# Patient Record
Sex: MALE | Race: WHITE | Employment: OTHER | URBAN - METROPOLITAN AREA
[De-identification: names, ages, dates, MRNs, and addresses within clinical notes are randomized per-mention and may not be internally consistent; named-entity substitution may affect disease eponyms.]

---

## 2017-05-23 ENCOUNTER — OFFICE VISIT (OUTPATIENT)
Dept: CARDIOLOGY CLINIC | Age: 57
End: 2017-05-23

## 2017-05-23 VITALS
WEIGHT: 242 LBS | SYSTOLIC BLOOD PRESSURE: 126 MMHG | DIASTOLIC BLOOD PRESSURE: 72 MMHG | BODY MASS INDEX: 32.07 KG/M2 | HEIGHT: 73 IN | HEART RATE: 81 BPM

## 2017-05-23 DIAGNOSIS — E78.5 HYPERLIPIDEMIA, UNSPECIFIED HYPERLIPIDEMIA TYPE: ICD-10-CM

## 2017-05-23 DIAGNOSIS — I48.0 PAF (PAROXYSMAL ATRIAL FIBRILLATION) (HCC): Primary | ICD-10-CM

## 2017-05-23 DIAGNOSIS — I45.10 INCOMPLETE RBBB: ICD-10-CM

## 2017-05-23 RX ORDER — ASPIRIN 81 MG/1
TABLET ORAL DAILY
COMMUNITY
End: 2017-12-08 | Stop reason: ALTCHOICE

## 2017-05-23 NOTE — LETTER
Gabriella Hough 1960 5/23/2017 Dear TERRANCE Zavaleta I had the pleasure of evaluating  Mr. Michael Concepcion in office today. Below are the relevant portions of my assessment and plan of care. ICD-10-CM ICD-9-CM 1. PAF (paroxysmal atrial fibrillation) (HCC) I48.0 427.31   
 no recent episode 
aspirin 2. Incomplete RBBB I45.10 426.4   
 old 3. Hyperlipidemia, unspecified hyperlipidemia type E78.5 272.4   
 stable-on lipitor 
lab with pcp Current Outpatient Prescriptions Medication Sig Dispense Refill  aspirin delayed-release 81 mg tablet Take  by mouth daily.  atorvastatin (LIPITOR) 20 mg tablet Take  by mouth daily. Orders Placed This Encounter  aspirin delayed-release 81 mg tablet Sig: Take  by mouth daily. If you have questions, please do not hesitate to call me. I look forward to following Mr. Michael Concepcion along with you. Sincerely, Bita Mora MD

## 2017-05-23 NOTE — MR AVS SNAPSHOT
Visit Information Date & Time Provider Department Dept. Phone Encounter #  
 5/23/2017  9:00 AM Jorge L Daniels MD [facility] [phone] [id number] Follow-up Instructions Return in about 6 months (around 11/23/2017). Upcoming Health Maintenance Date Due Hepatitis C Screening 1960 DTaP/Tdap/Td series (1 - Tdap) 6/4/1981 FOBT Q 1 YEAR AGE 50-75 6/4/2010 INFLUENZA AGE 9 TO ADULT 8/1/2017 Allergies as of 5/23/2017  Review Complete On: 5/23/2017 By: Jorge L Daniels MD  
 No Known Allergies Current Immunizations  Never Reviewed No immunizations on file. Not reviewed this visit You Were Diagnosed With   
  
 Codes Comments PAF [facility]oxysmal [facility] fibrillation) ([facility]    -  Primary ICD-10-CM: I48.0 ICD-9-CM: 427.31 no recent episode 
aspirin Incomplete RBBB     ICD-10-CM: I45.10 ICD-9-CM: 0.4 old Hyperlipidemia, unspecified hyperlipidemia type     ICD-10-CM: E78.5 ICD-9-CM: 272.4 stable-on lipitor 
lab with pcp Vitals BP Pulse Height(growth percentile) Weight(growth percentile) BMI Smoking Status 126/72 81 6' 1\" (1.854 m) 242 lb (109.8 kg) 31.93 kg/m2 Former Smoker Vitals History BMI and BSA Data Body Mass Index Body Surface Area  
 31.93 kg/m 2 2.38 m 2 Your Updated Medication List  
  
   
This list is accurate as of: 5/23/17  9:45 AM.  Always use your most recent med list.  
  
  
  
  
 aspirin delayed-release 81 mg tablet Take  by mouth daily. atorvastatin 20 mg tablet Commonly known as:  LIPITOR Take  by mouth daily. Follow-up Instructions Return in about 6 months (around 11/23/2017). Introducing [facility] & [facility]! Brandon Urbina introduces [employer] patient portal. Now you can access parts of your medical record, email your doctor's office, and request medication refills online. 1. In your internet browser, go to [email or web address]. [email or web address] Iron Software 2. Click on the First Time User? Click Here link in the Sign In box. You will see the New Member Sign Up page. 3. Enter your Adisn Access Code exactly as it appears below. You will not need to use this code after youve completed the sign-up process. If you do not sign up before the expiration date, you must request a new code. · Adisn Access Code: [email or web address] Expires: 8/21/2017  9:23 AM 
 
4. Enter the last four digits of your Social Security Number (xxxx) and Date of Birth (mm/dd/yyyy) as indicated and click Submit. You will be taken to the next sign-up page. 5. Create a Adisn ID. This will be your Adisn login ID and cannot be changed, so think of one that is secure and easy to remember. 6. Create a Adisn password. You can change your password at any time. 7. Enter your Password Reset Question and Answer. This can be used at a later time if you forget your password. 8. Enter your e-mail address. You will receive e-mail notification when new information is available in [address] 9. Click Sign Up. You can now view and download portions of your medical record. 10. Click the Download Summary menu link to download a portable copy of your medical information. If you have questions, please visit the Frequently Asked Questions section of the Adisn website. Remember, Adisn is NOT to be used for urgent needs. For medical emergencies, dial 911. Now available from your iPhone and Android! Please provide this summary of care documentation to your next provider. Your primary care clinician is listed as Randall Torres. If you have any questions after today's visit, please call [phone].

## 2017-05-23 NOTE — PROGRESS NOTES
HISTORY OF PRESENT ILLNESS  Sav Cantu is a 64 y.o. male. HPI Comments: Patient with paf,hyperlipidemia  On follow up patient denies any chest pains,sob, palpitation or other significant symptoms. One episode of extreme fatigue while in house-resolved with rest-no recurrence    Palpitations    The history is provided by the patient. This is a new problem. The problem has been resolved. The problem is associated with nothing. Pertinent negatives include no fever, no chest pain, no claudication, no orthopnea, no PND, no abdominal pain, no nausea, no vomiting, no headaches, no dizziness, no weakness, no cough, no hemoptysis, no shortness of breath and no sputum production. Review of Systems   Constitutional: Negative for chills and fever. HENT: Negative for nosebleeds. Eyes: Negative for blurred vision and double vision. Respiratory: Negative for cough, hemoptysis, sputum production, shortness of breath and wheezing. Cardiovascular: Negative for chest pain, palpitations, orthopnea, claudication, leg swelling and PND. Gastrointestinal: Negative for abdominal pain, heartburn, nausea and vomiting. Musculoskeletal: Negative for myalgias. Skin: Negative for rash. Neurological: Negative for dizziness, weakness and headaches. Endo/Heme/Allergies: Does not bruise/bleed easily.      Family History   Problem Relation Age of Onset   [place] Stroke Father        Past Medical History:   Diagnosis Date    Hyperlipidemia        Past Surgical History:   Procedure Laterality Date    HX HERNIA REPAIR         Social History   Substance Use Topics    Smoking status: Former Smoker     Types: Cigarettes     Quit date: 3/9/2013    Smokeless tobacco: Not on file    Alcohol use 0.0 oz/week     0 Standard drinks or equivalent per week      Comment: OCC        No Known Allergies    Outpatient Prescriptions Marked as Taking for the 5/23/17 encounter (Office Visit) with Lesly Laurent MD   Medication Sig Dispense Refill    aspirin delayed-release 81 mg tablet Take  by mouth daily.  atorvastatin (LIPITOR) 20 mg tablet Take  by mouth daily. Visit Vitals    /72    Pulse 81    Ht 6' 1\" (1.854 m)    Wt 109.8 kg (242 lb)    BMI 31.93 kg/m2         Physical Exam   Constitutional: He is oriented to person, place, and time. He appears well-developed and well-nourished. HENT:   Head: Normocephalic and atraumatic. Eyes: Conjunctivae are normal.   Neck: Neck supple. No JVD present. No tracheal deviation present. No thyromegaly present. Cardiovascular: Normal rate, regular rhythm and normal heart sounds. Exam reveals no gallop and no friction rub. No murmur heard. Pulmonary/Chest: Breath sounds normal. No respiratory distress. He has no wheezes. He has no rales. He exhibits no tenderness. Abdominal: Soft. There is no tenderness. Musculoskeletal: He exhibits no edema. Neurological: He is alert and oriented to person, place, and time. Skin: Skin is warm and dry. Psychiatric: He has a normal mood and affect. Mr. Catalina Cruz has a reminder for a \"due or due soon\" health maintenance. I have asked that he contact his primary care provider for follow-up on this health maintenance. I have personally reviewed patient's records available from hospital and other providers and incorporated findings in patient care. pcp-3/2016  SUMMARY:echo:2016  Left ventricle: Systolic function was normal. Ejection fraction was  estimated to be 60 %. No obvious wall motion abnormalities identified in  the views obtained. There was mild concentric hypertrophy. Doppler  parameters were consistent with abnormal left ventricular relaxation  (grade 1 diastolic dysfunction). Mitral valve: There was trivial regurgitation. NUCLEAR IMAGIN2016     Findings:   1. Stress images reveal normal Myoview distrubution in all the LV segments in short axis, vertical and horizontal long axis views.    2. Resting images have a normal uptake. 3. Gated images reveal normal wall motion and the ejection fraction is calculated to be 85%. Conclusion:   1. Normal perfusion scan. 2. Normal wall motion and ejection fraction. 3. Low risk scan.  4/2016-event monitor  Sr,st,no afib,? svt  Assessment         ICD-10-CM ICD-9-CM    1. PAF (paroxysmal atrial fibrillation) (HCC) I48.0 427.31     no recent episode  aspirin   2. Incomplete RBBB I45.10 426.4     old   3. Hyperlipidemia, unspecified hyperlipidemia type E78.5 272.4     stable-on lipitor  lab with pcp   chads-vasc score low  Will use asa qd    There are no discontinued medications. No orders of the defined types were placed in this encounter. Follow-up Disposition:  Return in about 6 months (around 11/23/2017).

## 2017-05-23 NOTE — PROGRESS NOTES
1. Have you been to the ER, urgent care clinic since your last visit? Hospitalized since your last visit?     no  2. Have you seen or consulted any other health care providers outside of the [address] [place] [address] since your last visit? Include any pap smears or colon screening. Yes Where: Dr. Carlyn Baig     3. Since your last visit, have you had any of the following symptoms?   no     4. Have you had any blood work, X-rays or cardiac testing?       Yes Where: Natalya Buitrago

## 2017-11-09 ENCOUNTER — OFFICE VISIT (OUTPATIENT)
Dept: CARDIOLOGY CLINIC | Age: 57
End: 2017-11-09

## 2017-11-09 VITALS
WEIGHT: 260 LBS | SYSTOLIC BLOOD PRESSURE: 119 MMHG | BODY MASS INDEX: 34.46 KG/M2 | HEART RATE: 73 BPM | HEIGHT: 73 IN | DIASTOLIC BLOOD PRESSURE: 71 MMHG

## 2017-11-09 DIAGNOSIS — E66.9 CLASS 1 OBESITY WITHOUT SERIOUS COMORBIDITY WITH BODY MASS INDEX (BMI) OF 34.0 TO 34.9 IN ADULT, UNSPECIFIED OBESITY TYPE: ICD-10-CM

## 2017-11-09 DIAGNOSIS — I45.10 INCOMPLETE RBBB: ICD-10-CM

## 2017-11-09 DIAGNOSIS — E78.5 HYPERLIPIDEMIA, UNSPECIFIED HYPERLIPIDEMIA TYPE: ICD-10-CM

## 2017-11-09 DIAGNOSIS — I48.0 PAF (PAROXYSMAL ATRIAL FIBRILLATION) (HCC): Primary | ICD-10-CM

## 2017-11-09 NOTE — PROGRESS NOTES
HISTORY OF PRESENT ILLNESS  Edyta Maya is a 62 y.o. male. HPI Comments: Patient with paf,hyperlipidemia  On follow up patient denies any chest pains,sob, palpitation or other significant symptoms. One episode of extreme fatigue while in house-resolved with rest-no recurrence    Cholesterol Problem   The history is provided by the patient. This is a chronic problem. The problem occurs constantly. The problem has not changed since onset. Pertinent negatives include no chest pain, no abdominal pain, no headaches and no shortness of breath. Palpitations    The history is provided by the patient. This is a new problem. The problem has been resolved. The problem is associated with nothing. Pertinent negatives include no fever, no chest pain, no claudication, no orthopnea, no PND, no abdominal pain, no nausea, no vomiting, no headaches, no dizziness, no weakness, no cough, no hemoptysis, no shortness of breath and no sputum production. Review of Systems   Constitutional: Negative for chills and fever. HENT: Negative for nosebleeds. Eyes: Negative for blurred vision and double vision. Respiratory: Negative for cough, hemoptysis, sputum production, shortness of breath and wheezing. Cardiovascular: Negative for chest pain, palpitations, orthopnea, claudication, leg swelling and PND. Gastrointestinal: Negative for abdominal pain, heartburn, nausea and vomiting. Musculoskeletal: Negative for myalgias. Skin: Negative for rash. Neurological: Negative for dizziness, weakness and headaches. Endo/Heme/Allergies: Does not bruise/bleed easily.      Family History   Problem Relation Age of Onset    Stroke Father        Past Medical History:   Diagnosis Date    Hyperlipidemia        Past Surgical History:   Procedure Laterality Date    HX HERNIA REPAIR         Social History   Substance Use Topics    Smoking status: Former Smoker     Types: Cigarettes     Quit date: 3/9/2013    Smokeless tobacco: Never Used    Alcohol use 0.0 oz/week     0 Standard drinks or equivalent per week      Comment: OCC        No Known Allergies    Outpatient Prescriptions Marked as Taking for the 11/9/17 encounter (Office Visit) with Julianna Lobato MD   Medication Sig Dispense Refill    apixaban (ELIQUIS) 5 mg tablet Take 1 Tab by mouth two (2) times a day. 60 Tab 6    aspirin delayed-release 81 mg tablet Take  by mouth daily.  atorvastatin (LIPITOR) 20 mg tablet Take  by mouth daily. Visit Vitals    /71    Pulse 73    Ht 6' 1\" (1.854 m)    Wt 117.9 kg (260 lb)    BMI 34.3 kg/m2         Physical Exam   Constitutional: He is oriented to person, place, and time. He appears well-developed and well-nourished. HENT:   Head: Normocephalic and atraumatic. Eyes: Conjunctivae are normal.   Neck: Neck supple. No JVD present. No tracheal deviation present. No thyromegaly present. Cardiovascular: Normal rate and normal heart sounds. An irregularly irregular rhythm present. Exam reveals no gallop and no friction rub. No murmur heard. Pulmonary/Chest: Breath sounds normal. No respiratory distress. He has no wheezes. He has no rales. He exhibits no tenderness. Abdominal: Soft. There is no tenderness. Musculoskeletal: He exhibits no edema. Neurological: He is alert and oriented to person, place, and time. Skin: Skin is warm and dry. Psychiatric: He has a normal mood and affect. Mr. Chester Ocampo has a reminder for a \"due or due soon\" health maintenance. I have asked that he contact his primary care provider for follow-up on this health maintenance. I have personally reviewed patient's records available from hospital and other providers and incorporated findings in patient care. pcp-3/2016  SUMMARY:echo:4/2016  Left ventricle: Systolic function was normal. Ejection fraction was  estimated to be 60 %. No obvious wall motion abnormalities identified in  the views obtained.  There was mild concentric hypertrophy. Doppler  parameters were consistent with abnormal left ventricular relaxation  (grade 1 diastolic dysfunction). Mitral valve: There was trivial regurgitation. NUCLEAR IMAGIN2016     Findings:   1. Stress images reveal normal Myoview distrubution in all the LV segments in short axis, vertical and horizontal long axis views. 2. Resting images have a normal uptake. 3. Gated images reveal normal wall motion and the ejection fraction is calculated to be 85%. Conclusion:   1. Normal perfusion scan. 2. Normal wall motion and ejection fraction. 3. Low risk scan.  2016-event monitor  Sr,st,no afib,? svt  Assessment         ICD-10-CM ICD-9-CM    1. PAF (paroxysmal atrial fibrillation) (HCC) I48.0 427.31     recurrent back in a fib  will add anticoagulation and consider antiarrhythmic in few weeks       2. Incomplete RBBB I45.10 426.4     old   3. Hyperlipidemia, unspecified hyperlipidemia type E78.5 272.4     stable on statin  lab with pcp   4. Class 1 obesity without serious comorbidity with body mass index (BMI) of 34.0 to 34.9 in adult, unspecified obesity type E66.9 278.00     Z68.34 V85.34     ?? sleep apnea  advised follow up at [address] [facility] sleep apnea study   chads-vasc score low  Will use asa every day    2017  Started eliquis for recurrent af-stopped asa  ? Antiarrhythmic in 4 weeks  I have discussed risk benefit and option of use of amiodarone for arrythmia. Risk of toxicity with medication are informed. Patient will require careful monitoring. There are no discontinued medications. Orders Placed This Encounter    apixaban (ELIQUIS) 5 mg tablet     Sig: Take 1 Tab by mouth two (2) times a day. Dispense:  60 Tab     Refill:  6       Follow-up Disposition:  Return in about 4 weeks (around 2017).

## 2017-11-09 NOTE — LETTER
Fritz Taylor 1960 11/9/2017 Dear TERRANCE Culver I had the pleasure of evaluating  Mr. Sage Dale in office today. Below are the relevant portions of my assessment and plan of care. ICD-10-CM ICD-9-CM 1. PAF (paroxysmal atrial fibrillation) (HCC) I48.0 427.31   
 recurrent back in a fib 
will add anticoagulation and consider antiarrhythmic in few weeks 2. Incomplete RBBB I45.10 426.4   
 old 3. Hyperlipidemia, unspecified hyperlipidemia type E78.5 272.4   
 stable on statin 
lab with pcp 4. Class 1 obesity without serious comorbidity with body mass index (BMI) of 34.0 to 34.9 in adult, unspecified obesity type E66.9 278.00   
 Z68.34 V85.34   
 ?? sleep apnea 
advised follow up at [address] [place] [address] sleep apnea study Current Outpatient Prescriptions Medication Sig Dispense Refill  apixaban (ELIQUIS) 5 mg tablet Take 1 Tab by mouth two (2) times a day. 60 Tab 6  
 aspirin delayed-release 81 mg tablet Take  by mouth daily.  atorvastatin (LIPITOR) 20 mg tablet Take  by mouth daily. Orders Placed This Encounter  apixaban (ELIQUIS) 5 mg tablet Sig: Take 1 Tab by mouth two (2) times a day. Dispense:  60 Tab Refill:  6 If you have questions, please do not hesitate to call me. I look forward to following Mr. Sage Dale along with you. Sincerely, Beata Taylor MD

## 2017-11-09 NOTE — PROGRESS NOTES
1. Have you been to the ER, urgent care clinic since your last visit? Hospitalized since your last visit?     no    2. Have you seen or consulted any other health care providers outside of the [address] [place] [address] since your last visit? Include any pap smears or colon screening. Yes Where: pcp     3. Since your last visit, have you had any of the following symptoms?  no

## 2017-11-09 NOTE — MR AVS SNAPSHOT
Visit Information Date & Time Provider Department Dept. Phone Encounter #  
 11/9/2017 10:15 AM Tara Delgado MD [facility] [phone] [id number] Follow-up Instructions Return in about 4 weeks (around 12/7/2017). Upcoming Health Maintenance Date Due Hepatitis C Screening 1960 DTaP/Tdap/Td series (1 - Tdap) 6/4/1981 FOBT Q 1 YEAR AGE 50-75 6/4/2010 Influenza Age 5 to Adult 8/1/2017 Allergies as of 11/9/2017  Review Complete On: 11/9/2017 By: Tara Delgado MD  
 No Known Allergies Current Immunizations  Never Reviewed No immunizations on file. Not reviewed this visit You Were Diagnosed With   
  
 Codes Comments PAF ([facility]oxysmal [facility] fibrillation) [facility]    -  Primary ICD-10-CM: I48.0 ICD-9-CM: 427.31 recurrent back in a fib 
will add anticoagulation and consider antiarrhythmic in few weeks Incomplete RBBB     ICD-10-CM: I45.10 ICD-9-CM: 0.4 old Hyperlipidemia, unspecified hyperlipidemia type     ICD-10-CM: E78.5 ICD-9-CM: 272.4 stable on statin 
lab with pcp Class 1 obesity without serious comorbidity with body mass index (BMI) of 34.0 to 34.9 in adult, unspecified obesity type     ICD-10-CM: E66.9, Z68.34 
ICD-9-CM: 278.00, V85.34 ?? sleep apnea 
advised follow up at [address] [place] [address] sleep apnea study Vitals BP Pulse Height(growth percentile) Weight(growth percentile) BMI Smoking Status 119/71 73 6' 1\" (1.854 m) 260 lb (117.9 kg) 34.3 kg/m2 Former Smoker Vitals History BMI and BSA Data Body Mass Index Body Surface Area  
 34.3 kg/m 2 2.46 m 2 Preferred Pharmacy Pharmacy Name Phone [id number] [address] [phone] Your Updated Medication List  
  
   
This list is accurate as of: 11/9/17 11:15 AM.  Always use your most recent med list.  
  
  
  
  
 apixaban 5 mg tablet Commonly known as:  Feliciana Spatz Take 1 Tab by mouth two (2) times a day. aspirin delayed-release 81 mg tablet Take  by mouth daily. atorvastatin 20 mg tablet Commonly known as:  LIPITOR Take  by mouth daily. Prescriptions Sent to Pharmacy Refills  
 apixaban (ELIQUIS) 5 mg tablet 6 Sig: Take 1 Tab by mouth two (2) times a day. Class: Normal  
 Pharmacy: [facility][id number] - [place], [address] [facility] #: [phone] Route: Oral  
  
Follow-up Instructions Return in about 4 weeks (around 12/7/2017). Introducing [facility] & HEALTH SERVICES! [employer] introduces FoodByNet patient portal. Now you can access parts of your medical record, email your doctor's office, and request medication refills online. 1. In your internet browser, go to [email or web address] 2. Click on the First Time User? Click Here link in the Sign In box. You will see the New Member Sign Up page. 3. Enter your FoodByNet Access Code exactly as it appears below. You will not need to use this code after youve completed the sign-up process. If you do not sign up before the expiration date, you must request a new code. · FoodByNet Access Code: [email or web address] Expires: 2/7/2018 10:34 AM 
 
4. Enter the last four digits of your Social Security Number (xxxx) and Date of Birth (mm/dd/yyyy) as indicated and click Submit. You will be taken to the next sign-up page. 5. Create a Brootat ID. This will be your FoodByNet login ID and cannot be changed, so think of one that is secure and easy to remember. 6. Create a FoodByNet password. You can change your password at any time. 7. Enter your Password Reset Question and Answer. This can be used at a later time if you forget your password. 8. Enter your e-mail address. You will receive e-mail notification when new information is available in [address] 9. Click Sign Up. You can now view and download portions of your medical record. 10. Click the Download Summary menu link to download a portable copy of your medical information. If you have questions, please visit the Frequently Asked Questions section of the [employer] website. Remember, [employer] is NOT to be used for urgent needs. For medical emergencies, dial 911. Now available from your iPhone and Android! Please provide this summary of care documentation to your next provider. Your primary care clinician is listed as [facility]. If you have any questions after today's visit, please call [phone].

## 2017-12-08 ENCOUNTER — OFFICE VISIT (OUTPATIENT)
Dept: CARDIOLOGY CLINIC | Age: 57
End: 2017-12-08

## 2017-12-08 VITALS
DIASTOLIC BLOOD PRESSURE: 77 MMHG | WEIGHT: 267 LBS | BODY MASS INDEX: 35.39 KG/M2 | HEIGHT: 73 IN | HEART RATE: 86 BPM | SYSTOLIC BLOOD PRESSURE: 122 MMHG

## 2017-12-08 DIAGNOSIS — I48.19 PERSISTENT ATRIAL FIBRILLATION (HCC): Primary | ICD-10-CM

## 2017-12-08 DIAGNOSIS — I45.10 INCOMPLETE RBBB: ICD-10-CM

## 2017-12-08 DIAGNOSIS — R06.02 SOB (SHORTNESS OF BREATH): ICD-10-CM

## 2017-12-08 DIAGNOSIS — Z79.899 HIGH RISK MEDICATION USE: ICD-10-CM

## 2017-12-08 RX ORDER — ASCORBIC ACID 500 MG
1000 TABLET ORAL
COMMUNITY

## 2017-12-08 RX ORDER — AMIODARONE HYDROCHLORIDE 200 MG/1
200 TABLET ORAL DAILY
Qty: 30 TAB | Refills: 6 | Status: SHIPPED | OUTPATIENT
Start: 2017-12-08 | End: 2018-04-04

## 2017-12-08 RX ORDER — FISH OIL/DHA/EPA 1200-144MG
CAPSULE ORAL
COMMUNITY

## 2017-12-08 RX ORDER — BISMUTH SUBSALICYLATE 262 MG
1 TABLET,CHEWABLE ORAL DAILY
COMMUNITY

## 2017-12-08 NOTE — MR AVS SNAPSHOT
Visit Information Date & Time Provider Department Dept. Phone Encounter #  
 12/8/2017  1:45 PM Tiffanie Silva MD [facility] [phone] [id number] Follow-up Instructions Return in about 8 weeks (around 2/2/2018). Upcoming Health Maintenance Date Due Hepatitis C Screening 1960 DTaP/Tdap/Td series (1 - Tdap) 6/4/1981 FOBT Q 1 YEAR AGE 50-75 6/4/2010 Influenza Age 5 to Adult 8/1/2017 Allergies as of 12/8/2017  Review Complete On: 12/8/2017 By: Tiffanie Silva MD  
 No Known Allergies Current Immunizations  Never Reviewed No immunizations on file. Not reviewed this visit You Were Diagnosed With   
  
 Codes Comments Persistent atrial fibrillation (HCC)    -  Primary ICD-10-CM: I48.1 ICD-9-CM: 427.31 Incomplete RBBB     ICD-10-CM: I45.10 ICD-9-CM: 426.4 SOB (shortness of breath)     ICD-10-CM: R06.02 
ICD-9-CM: 786.05 awaiting sleep study High risk medication use     ICD-10-CM: Z79.899 ICD-9-CM: V58.69 starting amiodarone 
cardioversion in few weeks Vitals BP Pulse Height(growth percentile) Weight(growth percentile) BMI Smoking Status 122/77 86 6' 1\" (1.854 m) 267 lb (121.1 kg) 35.23 kg/m2 Former Smoker Vitals History BMI and BSA Data Body Mass Index Body Surface Area  
 35.23 kg/m 2 2.5 m 2 Preferred Pharmacy Pharmacy Name Phone [facility][id number] - [address] [phone] Your Updated Medication List  
  
   
This list is accurate as of: 12/8/17  2:01 PM.  Always use your most recent med list.  
  
  
  
  
 amiodarone 200 mg tablet Commonly known as:  CORDARONE Take 1 Tab by mouth daily. apixaban 5 mg tablet Commonly known as:  Aliyah Can Take 1 Tab by mouth two (2) times a day. atorvastatin 20 mg tablet Commonly known as:  LIPITOR Take  by mouth daily. CALCARB 600 PO Take  by mouth. fish oil-dha-epa 1,200-144-216 mg Cap Take  by mouth.  
  
 multivitamin tablet Commonly known as:  ONE A DAY Take 1 Tab by mouth daily. VITAMIN C 500 mg tablet Generic drug:  ascorbic acid (vitamin C) Take 1,000 mg by mouth. Prescriptions Sent to Pharmacy Refills  
 amiodarone (CORDARONE) 200 mg tablet 6 Sig: Take 1 Tab by mouth daily. Class: Normal  
 Pharmacy: [id number] [address] [facility] #: [phone] Route: Oral  
  
Follow-up Instructions Return in about 8 weeks (around 2/2/2018). To-Do List   
 12/08/2017 Cardiac Services:  CARDIOVERSION, ELECTIVE Patient Instructions 1. You are scheduled to have a Cardioversion on 1/12/18  at [facility] .   Please check in at           . 2. Please go to THE [facility] and park in the outpatient parking lot that is located around to the back of the hospital and enter through the [facility] building. See map for directions. Once you enter through the [facility] check in with the  there. If for some reason, there is not a  available, please use the phone that is there. The  will either give you directions or assist you in getting to the cath holding area. 3.   You are not to eat or drink anything after midnight the morning of the procedure. If you are scheduled after 2 PM,   You may have a clear liquid breakfast before 8 AM the morning of  the       procedure and then nothing to eat or drink after 8 AM.  Clear liquids include:  Jello, Broth, Apple Juice, Water and Black Coffee. 4. Please continue to take your medications with a small sip of water. 5. If you are diabetic, do not take your insulin/sugar pill the morning of the procedure.  
 
6. We encourage families to wait in the waiting room on the first floor while the procedure is being done. The Doctor will come out and talk with you as soon as the procedure is over. 7. You will need someone to drive you home, so please have someone available. 8. If you or your family have any questions, please call our office Monday- Friday, 
     9:00 a.m.  4:30 p.m., at [phone], and ask to speak to one of the nurses. Introducing [facility]! [facility] introduces [employer] patient portal. Now you can access parts of your medical record, email your doctor's office, and request medication refills online. 1. In your internet browser, go to [email or web address]"[email or web address]. ONtheAIR/"[employer] 2. Click on the First Time User? Click Here link in the Sign In box. You will see the New Member Sign Up page. 3. Enter your [employer] Access Code exactly as it appears below. You will not need to use this code after youve completed the sign-up process. If you do not sign up before the expiration date, you must request a new code. · [employer] Access Code: [email or web address] Expires: 2/7/2018 10:34 AM 
 
4. Enter the last four digits of your Social Security Number (xxxx) and Date of Birth (mm/dd/yyyy) as indicated and click Submit. You will be taken to the next sign-up page. 5. Create a [employer] ID. This will be your [employer] login ID and cannot be changed, so think of one that is secure and easy to remember. 6. Create a [employer] password. You can change your password at any time. 7. Enter your Password Reset Question and Answer. This can be used at a later time if you forget your password. 8. Enter your e-mail address. You will receive e-mail notification when new information is available in [address] 9. Click Sign Up. You can now view and download portions of your medical record. 10. Click the Download Summary menu link to download a portable copy of your medical information.  
 
If you have questions, please visit the Frequently Asked Questions section of the [employer]. Remember, [employer] is NOT to be used for urgent needs. For medical emergencies, dial 911. Now available from your iPhone and Android! Please provide this summary of care documentation to your next provider. Your primary care clinician is listed as Lashawn Agee. If you have any questions after today's visit, please call [phone].

## 2017-12-08 NOTE — PROGRESS NOTES
HISTORY OF PRESENT ILLNESS  Kalina Arceo is a 62 y.o. male. HPI Comments: Patient with paf,hyperlipidemia  On follow up patient denies any chest pains,sob, palpitation or other significant symptoms. One episode of extreme fatigue while in house-resolved with rest-no recurrence    Cholesterol Problem   The history is provided by the patient. This is a chronic problem. The problem occurs constantly. The problem has not changed since onset. Pertinent negatives include no chest pain, no abdominal pain, no headaches and no shortness of breath. Palpitations    The history is provided by the patient. This is a new problem. The problem has been resolved. The problem is associated with nothing. Pertinent negatives include no fever, no chest pain, no claudication, no orthopnea, no PND, no abdominal pain, no nausea, no vomiting, no headaches, no dizziness, no weakness, no cough, no hemoptysis, no shortness of breath and no sputum production. Review of Systems   Constitutional: Negative for chills and fever. HENT: Negative for nosebleeds. Eyes: Negative for blurred vision and double vision. Respiratory: Negative for cough, hemoptysis, sputum production, shortness of breath and wheezing. Cardiovascular: Positive for palpitations. Negative for chest pain, orthopnea, claudication, leg swelling and PND. Gastrointestinal: Negative for abdominal pain, heartburn, nausea and vomiting. Musculoskeletal: Negative for myalgias. Skin: Negative for rash. Neurological: Negative for dizziness, weakness and headaches. Endo/Heme/Allergies: Does not bruise/bleed easily.      Family History   Problem Relation Age of Onset    Stroke Father        Past Medical History:   Diagnosis Date    Hyperlipidemia        Past Surgical History:   Procedure Laterality Date    HX HERNIA REPAIR         Social History   Substance Use Topics    Smoking status: Former Smoker     Types: Cigarettes     Quit date: 3/9/2013   Barbara Flowers Smokeless tobacco: Never Used    Alcohol use 0.0 oz/week     0 Standard drinks or equivalent per week      Comment: OCC        No Known Allergies    Outpatient Prescriptions Marked as Taking for the 12/8/17 encounter (Office Visit) with Elizabeth Hinojosa MD   Medication Sig Dispense Refill    CALCIUM CARBONATE (CALCARB 600 PO) Take  by mouth.  ascorbic acid, vitamin C, (VITAMIN C) 500 mg tablet Take 1,000 mg by mouth.  fish oil-dha-epa 1,200-144-216 mg cap Take  by mouth.  multivitamin (ONE A DAY) tablet Take 1 Tab by mouth daily.  amiodarone (CORDARONE) 200 mg tablet Take 1 Tab by mouth daily. 30 Tab 6    apixaban (ELIQUIS) 5 mg tablet Take 1 Tab by mouth two (2) times a day. 60 Tab 6    atorvastatin (LIPITOR) 20 mg tablet Take  by mouth daily. Visit Vitals    /77    Pulse 86    Ht 6' 1\" (1.854 m)    Wt 121.1 kg (267 lb)    BMI 35.23 kg/m2         Physical Exam   Constitutional: He is oriented to person, place, and time. He appears well-developed and well-nourished. HENT:   Head: Normocephalic and atraumatic. Eyes: Conjunctivae are normal.   Neck: Neck supple. No JVD present. No tracheal deviation present. No thyromegaly present. Cardiovascular: Normal rate and normal heart sounds. An irregularly irregular rhythm present. Exam reveals no gallop and no friction rub. No murmur heard. Pulmonary/Chest: Breath sounds normal. No respiratory distress. He has no wheezes. He has no rales. He exhibits no tenderness. Abdominal: Soft. There is no tenderness. Musculoskeletal: He exhibits no edema. Neurological: He is alert and oriented to person, place, and time. Skin: Skin is warm and dry. Psychiatric: He has a normal mood and affect. Mr. Megan Colunga has a reminder for a \"due or due soon\" health maintenance. I have asked that he contact his primary care provider for follow-up on this health maintenance.     I have personally reviewed patient's records available from hospital and other providers and incorporated findings in patient care. pcp-3/2016  SUMMARY:echo:2016  Left ventricle: Systolic function was normal. Ejection fraction was  estimated to be 60 %. No obvious wall motion abnormalities identified in  the views obtained. There was mild concentric hypertrophy. Doppler  parameters were consistent with abnormal left ventricular relaxation  (grade 1 diastolic dysfunction). Mitral valve: There was trivial regurgitation. NUCLEAR IMAGIN2016     Findings:   1. Stress images reveal normal Myoview distrubution in all the LV segments in short axis, vertical and horizontal long axis views. 2. Resting images have a normal uptake. 3. Gated images reveal normal wall motion and the ejection fraction is calculated to be 85%. Conclusion:   1. Normal perfusion scan. 2. Normal wall motion and ejection fraction. 3. Low risk scan.  2016-event monitor  Sr,st,no afib,? svt  Assessment         ICD-10-CM ICD-9-CM    1. Persistent atrial fibrillation (HCC) I48.1 427.31 CARDIOVERSION, ELECTIVE   2. Incomplete RBBB I45.10 426.4    3. SOB (shortness of breath) R06.02 786.05     awaiting sleep study   4. High risk medication use Z79.899 V58.69     starting amiodarone  cardioversion in few weeks   chads-vasc score low  Will use asa every day    2017  Started eliquis for recurrent af-stopped asa  ? Antiarrhythmic in 4 weeks  I have discussed risk benefit and option of use of amiodarone for arrythmia. Risk of toxicity with medication are informed. Patient will require careful monitoring.   2017-persistant a fib  Started amiodarone-cardioversion in 4 weeks  Medications Discontinued During This Encounter   Medication Reason    aspirin delayed-release 81 mg tablet Therapy Completed       Orders Placed This Encounter    CARDIOVERSION, ELECTIVE     Standing Status:   Future     Standing Expiration Date:   2018     Order Specific Question:   Reason for Exam:     Answer: af    amiodarone (CORDARONE) 200 mg tablet     Sig: Take 1 Tab by mouth daily. Dispense:  30 Tab     Refill:  6       Follow-up Disposition:  Return in about 8 weeks (around 2/2/2018).

## 2017-12-08 NOTE — LETTER
Edyta Maya 1960 12/8/2017 Dear TERRANCE Contreras I had the pleasure of evaluating  Mr. Adri Mccormick in office today. Below are the relevant portions of my assessment and plan of care. ICD-10-CM ICD-9-CM 1. Persistent atrial fibrillation (HCC) I48.1 427.31 CARDIOVERSION, ELECTIVE 2. Incomplete RBBB I45.10 426.4 3. SOB (shortness of breath) R06.02 786.05   
 awaiting sleep study 4. High risk medication use Z79.899 V58.69   
 starting amiodarone 
cardioversion in few weeks Current Outpatient Prescriptions Medication Sig Dispense Refill  CALCIUM CARBONATE (CALCARB 600 PO) Take  by mouth.  ascorbic acid, vitamin C, (VITAMIN C) 500 mg tablet Take 1,000 mg by mouth.  fish oil-dha-epa 1,200-144-216 mg cap Take  by mouth.  multivitamin (ONE A DAY) tablet Take 1 Tab by mouth daily.  amiodarone (CORDARONE) 200 mg tablet Take 1 Tab by mouth daily. 30 Tab 6  
 apixaban (ELIQUIS) 5 mg tablet Take 1 Tab by mouth two (2) times a day. 60 Tab 6  
 atorvastatin (LIPITOR) 20 mg tablet Take  by mouth daily. Orders Placed This Encounter  CARDIOVERSION, ELECTIVE Standing Status:   Future Standing Expiration Date:   6/8/2018 Order Specific Question:   Reason for Exam: Answer:   af  
 CALCIUM CARBONATE (CALCARB 600 PO) Sig: Take  by mouth.  ascorbic acid, vitamin C, (VITAMIN C) 500 mg tablet Sig: Take 1,000 mg by mouth.  fish oil-dha-epa 1,200-144-216 mg cap Sig: Take  by mouth.  multivitamin (ONE A DAY) tablet Sig: Take 1 Tab by mouth daily.  amiodarone (CORDARONE) 200 mg tablet Sig: Take 1 Tab by mouth daily. Dispense:  30 Tab Refill:  6 If you have questions, please do not hesitate to call me. I look forward to following Mr. Adri Mccormick along with you. Sincerely, Thea Boyer MD

## 2017-12-08 NOTE — PATIENT INSTRUCTIONS
1. You are scheduled to have a Cardioversion on 1/12/18  at [facility] .   Please check in at           . 2. Please go to [facility] and park in the outpatient parking lot that is located around to the back of the hospital and enter through the [facility] [address] building. See map for directions. Once you enter through the [facility] [address] check in with the  there. If for some reason, there is not a  available, please use the phone that is there. The  will either give you directions or assist you in getting to the cath holding area. 3.   You are not to eat or drink anything after midnight the morning of the procedure. If you are scheduled after 2 PM,   You may have a clear liquid breakfast before 8 AM the morning of  the       procedure and then nothing to eat or drink after 8 AM.  Clear liquids include:  Jello, Broth, Apple Juice, Water and Black Coffee. 4. Please continue to take your medications with a small sip of water. 5. If you are diabetic, do not take your insulin/sugar pill the morning of the procedure. 6. We encourage families to wait in the waiting room on the first floor while the procedure is being done. The Doctor will come out and talk with you as soon as the procedure is over. 7. You will need someone to drive you home, so please have someone available. 8. If you or your family have any questions, please call our office Monday- Friday,       9:00 a.m. - 4:30 p.m., at [phone], and ask to speak to one of the nurses.

## 2017-12-08 NOTE — PROGRESS NOTES
1. Have you been to the ER, urgent care clinic since your last visit? Hospitalized since your last visit?     no    2. Have you seen or consulted any other health care providers outside of the [address] [place] [address] since your last visit? Include any pap smears or colon screening. Yes Where: pcp     3. Since your last visit, have you had any of the following symptoms? shortness of breath.

## 2018-01-12 ENCOUNTER — ANESTHESIA EVENT (OUTPATIENT)
Dept: CARDIAC CATH/INVASIVE PROCEDURES | Age: 58
End: 2018-01-12
Payer: COMMERCIAL

## 2018-01-12 ENCOUNTER — ANESTHESIA (OUTPATIENT)
Dept: CARDIAC CATH/INVASIVE PROCEDURES | Age: 58
End: 2018-01-12
Payer: COMMERCIAL

## 2018-01-12 ENCOUNTER — HOSPITAL ENCOUNTER (OUTPATIENT)
Dept: CARDIAC CATH/INVASIVE PROCEDURES | Age: 58
Discharge: HOME | End: 2018-01-12
Attending: INTERNAL MEDICINE | Admitting: INTERNAL MEDICINE
Payer: COMMERCIAL

## 2018-01-12 VITALS
OXYGEN SATURATION: 96 % | SYSTOLIC BLOOD PRESSURE: 113 MMHG | BODY MASS INDEX: 34.72 KG/M2 | WEIGHT: 262 LBS | HEART RATE: 78 BPM | HEIGHT: 73 IN | DIASTOLIC BLOOD PRESSURE: 65 MMHG | RESPIRATION RATE: 18 BRPM

## 2018-01-12 LAB
ANION GAP BLD CALC-SCNC: 18 MMOL/L (ref 10–20)
BUN BLD-MCNC: 21 MG/DL (ref 7–18)
CA-I BLD-MCNC: 1.27 MMOL/L (ref 1.12–1.32)
CHLORIDE BLD-SCNC: 103 MMOL/L (ref 100–108)
CO2 BLD-SCNC: 29 MMOL/L (ref 19–24)
CREAT UR-MCNC: 1.4 MG/DL (ref 0.6–1.3)
GLUCOSE BLD STRIP.AUTO-MCNC: 104 MG/DL (ref 74–106)
HCT VFR BLD CALC: 46 % (ref 36–49)
HGB BLD-MCNC: 15.6 G/DL (ref 12–16)
POTASSIUM BLD-SCNC: 4.6 MMOL/L (ref 3.5–5.5)
SODIUM BLD-SCNC: 144 MMOL/L (ref 136–145)

## 2018-01-12 PROCEDURE — 74011000250 HC RX REV CODE- 250

## 2018-01-12 PROCEDURE — 80047 BASIC METABLC PNL IONIZED CA: CPT

## 2018-01-12 PROCEDURE — 93005 ELECTROCARDIOGRAM TRACING: CPT

## 2018-01-12 PROCEDURE — 77030028584 HC ELECTRD ECG PHYS -B

## 2018-01-12 PROCEDURE — 74011250636 HC RX REV CODE- 250/636

## 2018-01-12 PROCEDURE — 92960 CARDIOVERSION ELECTRIC EXT: CPT

## 2018-01-12 PROCEDURE — 76060000031 HC ANESTHESIA FIRST 0.5 HR

## 2018-01-12 RX ORDER — SODIUM CHLORIDE 9 MG/ML
INJECTION, SOLUTION INTRAVENOUS
Status: DISCONTINUED | OUTPATIENT
Start: 2018-01-12 | End: 2018-01-12 | Stop reason: HOSPADM

## 2018-01-12 RX ORDER — PROPOFOL 10 MG/ML
INJECTION, EMULSION INTRAVENOUS AS NEEDED
Status: DISCONTINUED | OUTPATIENT
Start: 2018-01-12 | End: 2018-01-12 | Stop reason: HOSPADM

## 2018-01-12 RX ORDER — LIDOCAINE HYDROCHLORIDE 20 MG/ML
INJECTION, SOLUTION EPIDURAL; INFILTRATION; INTRACAUDAL; PERINEURAL AS NEEDED
Status: DISCONTINUED | OUTPATIENT
Start: 2018-01-12 | End: 2018-01-12 | Stop reason: HOSPADM

## 2018-01-12 RX ADMIN — SODIUM CHLORIDE: 9 INJECTION, SOLUTION INTRAVENOUS at 10:38

## 2018-01-12 RX ADMIN — LIDOCAINE HYDROCHLORIDE 60 MG: 20 INJECTION, SOLUTION EPIDURAL; INFILTRATION; INTRACAUDAL; PERINEURAL at 10:40

## 2018-01-12 RX ADMIN — PROPOFOL 120 MG: 10 INJECTION, EMULSION INTRAVENOUS at 10:40

## 2018-01-12 NOTE — DISCHARGE INSTRUCTIONS
Electrical Cardioversion: What to Expect at Home  Your Recovery    Electrical cardioversion is a treatment for an abnormal heartbeat, such as atrial fibrillation, supraventricular tachycardia, or ventricular tachycardia (VT). It uses a brief electrical shock to reset your heart's rhythm. After cardioversion, you may have redness, like a sunburn, where the patches were. The medicines you got to make you sleepy may make you feel drowsy for the rest of the day. Your doctor may have you take medicines to help the heart beat normally and to prevent blood clots. This care sheet gives you a general idea about how long it will take for you to recover. But each person recovers at a different pace. Follow the steps below to feel better as quickly as possible. How can you care for yourself at home? Medicines  ? · Be safe with medicines. Take your medicines exactly as prescribed. Call your doctor if you think you are having a problem with your medicine. You may take one or more of the following medicines:  ¨ Rate-control medicines to slow the heart rate. These include beta-blockers, calcium channel blockers, and digoxin. ¨ Rhythm control medicines that help the heart keep a normal rhythm. ¨ Blood thinners, also called anticoagulants, which help prevent blood clots. You will get more details on the specific medicines your doctor prescribes. Be sure you know how to take your medicines safely. ? · Do not take any vitamins, over-the-counter medicines, or herbal products without talking to your doctor first.   Exercise  ? · Start light exercise if your doctor says that it is okay. Even a small amount will help you get stronger, have more energy, and manage your stress. Walking is an easy way to get exercise. Start out by walking a little more than you did in the hospital. Bit by bit, increase the amount you walk. ? · When you exercise, watch for signs that your heart is working too hard.  You are pushing too hard if you cannot talk while you are exercising. If you become short of breath or dizzy or have chest pain, sit down and rest right away. ? · Check your pulse regularly. Place two fingers on the artery at the palm side of your wrist in line with your thumb. If your heartbeat seems uneven or fast, talk to your doctor. Other instructions  ? · Ask your doctor when you can drive again. ? · Do not smoke. If you need help quitting, talk to your doctor about stop-smoking programs and medicines. These can increase your chances of quitting for good. ? · Limit alcohol. Follow-up care is a key part of your treatment and safety. Be sure to make and go to all appointments, and call your doctor if you are having problems. It's also a good idea to know your test results and keep a list of the medicines you take. When should you call for help? Call 911 anytime you think you may need emergency care. For example, call if:  ? · You passed out (lost consciousness). ? · You have chest pain or pressure. This may occur with:  ¨ Sweating. ¨ Shortness of breath. ¨ Nausea or vomiting. ¨ Pain that spreads from the chest to the neck, jaw, or one or both shoulders or arms. ¨ A fast or uneven pulse. After calling 911, the  may tell you to chew 1 adult-strength or 2 to 4 low-dose aspirin. Wait for an ambulance. Do not try to drive yourself. ? · You have symptoms of a stroke. These may include:  ¨ Sudden numbness, tingling, weakness, or loss of movement in your face, arm, or leg, especially on monica side of your body. ¨ Sudden vision changes. ¨ Sudden trouble speaking. ¨ Sudden confusion or trouble understanding simple statements. ¨ Sudden problems with walking or balance. ¨ A sudden, severe headache that is different from past headaches. ?Call your doctor now or seek immediate medical care if:  ? · You feel dizzy or lightheaded, or you feel like you may faint. ? · You have a fast or irregular heartbeat. ? Watch closely for any changes in your health, and be sure to contact your doctor if you have any problems. Where can you learn more? Go to http://josé-angelo.info/. Enter A617 in the search box to learn more about \"Electrical Cardioversion: What to Expect at Home. \"  Current as of: September 21, 2016  Content Version: 11.4  © 0355-7544 [employer]. Care instructions adapted under license by [employer] (which disclaims liability or warranty for this information). If you have questions about a medical condition or this instruction, always ask your healthcare professional. [place] [address] any warranty or liability for your use of this information. DISCHARGE SUMMARY from Nurse    PATIENT INSTRUCTIONS:    After general anesthesia or intravenous sedation, for 24 hours or while taking prescription Narcotics:  · Limit your activities  · Do not drive and operate hazardous machinery  · Do not make important personal or business decisions  · Do  not drink alcoholic beverages  · If you have not urinated within 8 hours after discharge, please contact your surgeon on call. Report the following to your surgeon:  · Excessive pain, swelling, redness or odor of or around the surgical area  · Temperature over 100.5  · Nausea and vomiting lasting longer than 4 hours or if unable to take medications  · Any signs of decreased circulation or nerve impairment to extremity: change in color, persistent  numbness, tingling, coldness or increase pain  · Any questions    What to do at Home:  Recommended activity: Activity as tolerated and no driving for today,     *  Please give a list of your current medications to your Primary Care Provider. *  Please update this list whenever your medications are discontinued, doses are      changed, or new medications (including over-the-counter products) are added.     *  Please carry medication information at all times in case of emergency situations. These are general instructions for a healthy lifestyle:    No smoking/ No tobacco products/ Avoid exposure to second hand smoke  Surgeon General's Warning:  Quitting smoking now greatly reduces serious risk to your health. Obesity, smoking, and sedentary lifestyle greatly increases your risk for illness    A healthy diet, regular physical exercise & weight monitoring are important for maintaining a healthy lifestyle    You may be retaining fluid if you have a history of heart failure or if you experience any of the following symptoms:  Weight gain of 3 pounds or more overnight or 5 pounds in a week, increased swelling in our hands or feet or shortness of breath while lying flat in bed. Please call your doctor as soon as you notice any of these symptoms; do not wait until your next office visit. Recognize signs and symptoms of STROKE:    F-face looks uneven    A-arms unable to move or move unevenly    S-speech slurred or non-existent    T-time-call 911 as soon as signs and symptoms begin-DO NOT go       Back to bed or wait to see if you get better-TIME IS BRAIN. Warning Signs of HEART ATTACK     Call 911 if you have these symptoms:   Chest discomfort. Most heart attacks involve discomfort in the center of the chest that lasts more than a few minutes, or that goes away and comes back. It can feel like uncomfortable pressure, squeezing, fullness, or pain.  Discomfort in other areas of the upper body. Symptoms can include pain or discomfort in one or both arms, the back, neck, jaw, or stomach.  Shortness of breath with or without chest discomfort.  Other signs may include breaking out in a cold sweat, nausea, or lightheadedness. Don't wait more than five minutes to call 911 - MINUTES MATTER! Fast action can save your life. Calling 911 is almost always the fastest way to get lifesaving treatment.  Emergency Medical Services staff can begin treatment when they arrive -- up to an hour sooner than if someone gets to the hospital by car. The discharge information has been reviewed with the patient. The patient verbalized understanding. Discharge medications reviewed with the patient and appropriate educational materials and side effects teaching were provided.   ___________________________________________________________________________________________________________________________________

## 2018-01-12 NOTE — IP AVS SNAPSHOT
John Ayala 
 
 
 920 [facility] [address] Patient: Iain Santiago MRN: [id number] IJI:[id number]/[id number] About your hospitalization You were admitted on:  January 12, 2018 You last received care in the:  [facility] 1 CATH HOLDING You were discharged on:  January 12, 2018 Why you were hospitalized Your primary diagnosis was:  Not on File Follow-up Information Follow up With Details Comments Contact Info Alpa Quinn MD Schedule an appointment as soon as possible for a visit in 2 weeks  [address] [facility] [address] [facility] [address] 
[phone] EKG after cardioversion Your Scheduled Appointments Friday January 12, 2018 11:45 AM EST CARDIOVERSION with [facility] CATH HOLDING, [facility] ANESTHESIA 2 [facility] CARDIAC CATH LAB [address] [place] [address] 920 [facility] 312 [facility] [address] [facility], [address] Thursday February 01, 2018 11:00 AM EST Follow Up with Alpa Quinn MD  
[facility] ([address] [facility]) [address] [facility]  
[phone] Discharge Orders None A check paul indicates which time of day the medication should be taken. My Medications ASK your doctor about these medications Instructions Each Dose to Equal  
 Morning Noon Evening Bedtime  
 amiodarone 200 mg tablet Commonly known as:  CORDARONE Your last dose was: Your next dose is: Take 1 Tab by mouth daily. 200 mg  
    
   
   
   
  
 apixaban 5 mg tablet Commonly known as:  Niall Rued Your last dose was: Your next dose is: Take 1 Tab by mouth two (2) times a day. 5 mg  
    
   
   
   
  
 atorvastatin 20 mg tablet Commonly known as:  LIPITOR Your last dose was: Your next dose is: Take  by mouth daily. CALCARB 600 PO Your last dose was: Your next dose is: Take  by mouth. fish oil-dha-epa 1,200-144-216 mg Cap Your last dose was: Your next dose is: Take  by mouth.  
     
   
   
   
  
 multivitamin tablet Commonly known as:  ONE A DAY Your last dose was: Your next dose is: Take 1 Tab by mouth daily. 1 Tab VITAMIN C 500 mg tablet Generic drug:  ascorbic acid (vitamin C) Your last dose was: Your next dose is: Take 1,000 mg by mouth. 1000 mg Discharge Instructions Electrical Cardioversion: What to Expect at [facility] Your Recovery Electrical cardioversion is a treatment for an abnormal heartbeat, such as atrial fibrillation, supraventricular tachycardia, or ventricular tachycardia (VT). It uses a brief electrical shock to reset your heart's rhythm. After cardioversion, you may have redness, like a sunburn, where the patches were. The medicines you got to make you sleepy may make you feel drowsy for the rest of the day. Your doctor may have you take medicines to help the heart beat normally and to prevent blood clots. This care sheet gives you a general idea about how long it will take for you to recover. But each person recovers at a different pace. Follow the steps below to feel better as quickly as possible. How can you care for yourself at home? Medicines ? · Be safe with medicines. Take your medicines exactly as prescribed. Call your doctor if you think you are having a problem with your medicine. You may take one or more of the following medicines: 
¨ Rate-control medicines to slow the heart rate. These include beta-blockers, calcium channel blockers, and digoxin. ¨ Rhythm control medicines that help the heart keep a normal rhythm. ¨ Blood thinners, also called anticoagulants, which help prevent blood clots. You will get more details on the specific medicines your doctor prescribes. Be sure you know how to take your medicines safely. ? · Do not take any vitamins, over-the-counter medicines, or herbal products without talking to your doctor first.  
Exercise ? · Start light exercise if your doctor says that it is okay. Even a small amount will help you get stronger, have more energy, and manage your stress. Walking is an easy way to get exercise. Start out by walking a little more than you did in the hospital. Bit by bit, increase the amount you walk. ? · When you exercise, watch for signs that your heart is working too hard. You are pushing too hard if you cannot talk while you are exercising. If you become short of breath or dizzy or have chest pain, sit down and rest right away. ? · Check your pulse regularly. Place two fingers on the artery at the palm side of your wrist in line with your thumb. If your heartbeat seems uneven or fast, talk to your doctor. Other instructions ? · Ask your doctor when you can drive again. ? · Do not smoke. If you need help quitting, talk to your doctor about stop-smoking programs and medicines. These can increase your chances of quitting for good. ? · Limit alcohol. Follow-up care is a key part of your treatment and safety. Be sure to make and go to all appointments, and call your doctor if you are having problems. It's also a good idea to know your test results and keep a list of the medicines you take. When should you call for help? Call 911 anytime you think you may need emergency care. For example, call if: 
? · You passed out (lost consciousness). ? · You have chest pain or pressure. This may occur with: ¨ Sweating. ¨ Shortness of breath. ¨ Nausea or vomiting. ¨ Pain that spreads from the chest to the neck, jaw, or one or both shoulders or arms. ¨ A fast or uneven pulse. After calling 911, the  may tell you to chew 1 adult-strength or 2 to 4 low-dose aspirin. Wait for an ambulance. Do not try to drive yourself. ? · You have symptoms of a stroke. These may include: 
¨ Sudden numbness, tingling, weakness, or loss of movement in your face, arm, or leg, especially on monica side of your body. ¨ Sudden vision changes. ¨ Sudden trouble speaking. ¨ Sudden confusion or trouble understanding simple statements. ¨ Sudden problems with walking or balance. ¨ A sudden, severe headache that is different from past headaches. ?Call your doctor now or seek immediate medical care if: 
? · You feel dizzy or lightheaded, or you feel like you may faint. ? · You have a fast or irregular heartbeat. ? Watch closely for any changes in your health, and be sure to contact your doctor if you have any problems. Where can you learn more? Go to http://josé-angelo.info/. Enter A617 in the search box to learn more about \"Electrical Cardioversion: What to Expect at Home. \" Current as of: September 21, 2016 Content Version: 11.4 © 3715-3158 [employer]. Care instructions adapted under license by [employer] (which disclaims liability or warranty for this information). If you have questions about a medical condition or this instruction, always ask your healthcare professional. [address] any warranty or liability for your use of this information. DISCHARGE SUMMARY from Nurse PATIENT INSTRUCTIONS: 
 
 
F-face looks uneven A-arms unable to move or move unevenly S-speech slurred or non-existent T-time-call 911 as soon as signs and symptoms begin-DO NOT go Back to bed or wait to see if you get better-TIME IS BRAIN. Warning Signs of HEART ATTACK Call 911 if you have these symptoms: ? Chest discomfort. Most heart attacks involve discomfort in the center of the chest that lasts more than a few minutes, or that goes away and comes back. It can feel like uncomfortable pressure, squeezing, fullness, or pain. ? Discomfort in other areas of the upper body. Symptoms can include pain or discomfort in one or both arms, the back, neck, jaw, or stomach. ? Shortness of breath with or without chest discomfort. ? Other signs may include breaking out in a cold sweat, nausea, or lightheadedness. Don't wait more than five minutes to call [address]! Fast action can save your life. Calling 911 is almost always the fastest way to get lifesaving treatment. Emergency Medical Services staff can begin treatment when they arrive  up to an hour sooner than if someone gets to the hospital by car. The discharge information has been reviewed with the patient. The patient verbalized understanding. Discharge medications reviewed with the patient and appropriate educational materials and side effects teaching were provided. ___________________________________________________________________________________________________________________________________ Introducing \[facility] [place] [facility]\"" & HEALTH SERVICES! Ellie Watson introduces Everwise patient portal. Now you can access parts of your medical record, email your doctor's office, and request medication refills online. 1. In your internet browser, go to [email or web address] 2. Click on the First Time User? Click Here link in the Sign In box. You will see the New Member Sign Up page. 3. Enter your Everwise Access Code exactly as it appears below. You will not need to use this code after youve completed the sign-up process. If you do not sign up before the expiration date, you must request a new code. · Everwise Access Code: [email or web address] Expires: 2/7/2018 10:34 AM 
 
4.  Enter the last four digits of your Social Security Number (xxxx) and Date of Birth (mm/dd/yyyy) as indicated and click Submit. You will be taken to the next sign-up page. 5. Create a [employer] ID. This will be your [employer] login ID and cannot be changed, so think of one that is secure and easy to remember. 6. Create a [employer] password. You can change your password at any time. 7. Enter your Password Reset Question and Answer. This can be used at a later time if you forget your password. 8. Enter your e-mail address. You will receive e-mail notification when new information is available in [address] 9. Click Sign Up. You can now view and download portions of your medical record. 10. Click the Download Summary menu link to download a portable copy of your medical information. If you have questions, please visit the Frequently Asked Questions section of the [employer] website. Remember, [employer] is NOT to be used for urgent needs. For medical emergencies, dial 911. Now available from your iPhone and Android! Unresulted Labs-Please follow up with your PCP about these lab tests Order Current Status EKG, 12 LEAD, INITIAL Preliminary result Providers Seen During Your Hospitalization Provider Specialty Primary office phone Cyndy Shaffer MD Cardiology [phone] Your Primary Care Physician (PCP) Primary Care Physician Office Phone Office Fax Nichomark Barrycristian [phone] You are allergic to the following No active allergies Recent Documentation Height Weight BMI Smoking Status 1.854 m 118.8 kg 34.57 kg/m2 Former Smoker Emergency Contacts Name Discharge Info Relation Home Work [employer] [facility] CAREGIVER [3] Brother [24] [phone] [facility] HOSPITAL DISCHARGE CAREGIVER [3] Sister [23]   [phone] Patient Belongings The following personal items are in your possession at time of discharge: 
     Visual Aid: None Please provide this summary of care documentation to your next provider. Signatures-by signing, you are acknowledging that this After Visit Summary has been reviewed with you and you have received a copy. Patient Signature:  ____________________________________________________________ Date:  ____________________________________________________________  
  
Devota Dandy Provider Signature:  ____________________________________________________________ Date:  ____________________________________________________________

## 2018-01-12 NOTE — ANESTHESIA POSTPROCEDURE EVALUATION
Post-Anesthesia Evaluation and Assessment    Patient: Aishwarya Ty MRN: [id number]  SSN: [id number]    YOB: 1960  Age: 62 y.o. Sex: male       Cardiovascular Function/Vital Signs  Visit Vitals    /65    Pulse 78    Resp 18    Ht 6' 1\" (1.854 m)    Wt 118.8 kg (262 lb)    SpO2 96%    BMI 34.57 kg/m2       Patient is status post MAC anesthesia for * No procedures listed *. Nausea/Vomiting: None    Postoperative hydration reviewed and adequate. Pain:  Pain Scale 1: Numeric (0 - 10) (01/12/18 1025)  Pain Intensity 1: 0 (01/12/18 1025)   Managed    Neurological Status: At baseline    Mental Status and Level of Consciousness: Arousable    Pulmonary Status:   O2 Device: Room air (01/12/18 1116)   Adequate oxygenation and airway patent    Complications related to anesthesia: None    Post-anesthesia assessment completed.  No concerns    Signed By: Ayla Akins MD     January 12, 2018

## 2018-01-12 NOTE — H&P
H&P update    No new symptoms  Risks, benefits and alternatives of DCCV explained to patient.   All questions answered

## 2018-01-12 NOTE — ANESTHESIA PREPROCEDURE EVALUATION
Anesthetic History   No history of anesthetic complications            Review of Systems / Medical History  Patient summary reviewed and pertinent labs reviewed    Pulmonary  Within defined limits                 Neuro/Psych   Within defined limits           Cardiovascular            Dysrhythmias : atrial fibrillation      Exercise tolerance: >4 METS     GI/Hepatic/Renal  Within defined limits              Endo/Other        Obesity     Other Findings   Comments:   Risk Factors for Postoperative nausea/vomiting:       History of postoperative nausea/vomiting? NO       Female? NO       Motion sickness? NO       Intended opioid administration for postoperative analgesia? NO      Smoking Abstinence  Current Smoker? NO  Elective Surgery? YES  Seen preoperatively by anesthesiologist or proxy prior to day of surgery? YES  Pt abstained from smoking 24 hours prior to anesthesia?  N/A           Physical Exam    Airway  Mallampati: II  TM Distance: 4 - 6 cm    Mouth opening: Normal     Cardiovascular    Rhythm: irregular           Dental  No notable dental hx       Pulmonary  Breath sounds clear to auscultation               Abdominal  GI exam deferred       Other Findings            Anesthetic Plan    ASA: 3  Anesthesia type: MAC          Induction: Intravenous  Anesthetic plan and risks discussed with: Patient

## 2018-01-12 NOTE — PROCEDURES
Sedation provided by anesthesiology  200J x 1 DCCV provided.   Patient converted to NSR  No complications    Plan:  Continue current meds  Follow up with Dr Ting Rodriguez in 2 weeks

## 2018-01-12 NOTE — PROGRESS NOTES
Pt and sister provided discharge instructions. All questions answered and pt verbalized understanding. PIV removed. Neuro intact. Pt escorted to front of building for discharge.  Patient armband removed and shredded

## 2018-01-13 LAB
ATRIAL RATE: 82 BPM
CALCULATED P AXIS, ECG09: 66 DEGREES
CALCULATED R AXIS, ECG10: 28 DEGREES
CALCULATED T AXIS, ECG11: 31 DEGREES
DIAGNOSIS, 93000: NORMAL
P-R INTERVAL, ECG05: 186 MS
Q-T INTERVAL, ECG07: 402 MS
QRS DURATION, ECG06: 120 MS
QTC CALCULATION (BEZET), ECG08: 469 MS
VENTRICULAR RATE, ECG03: 82 BPM

## 2018-02-01 ENCOUNTER — OFFICE VISIT (OUTPATIENT)
Dept: CARDIOLOGY CLINIC | Age: 58
End: 2018-02-01

## 2018-02-01 VITALS
WEIGHT: 273 LBS | SYSTOLIC BLOOD PRESSURE: 126 MMHG | HEART RATE: 70 BPM | BODY MASS INDEX: 36.18 KG/M2 | DIASTOLIC BLOOD PRESSURE: 83 MMHG | HEIGHT: 73 IN

## 2018-02-01 DIAGNOSIS — I48.19 PERSISTENT ATRIAL FIBRILLATION (HCC): Primary | ICD-10-CM

## 2018-02-01 DIAGNOSIS — Z98.890 HISTORY OF CARDIOVERSION: ICD-10-CM

## 2018-02-01 DIAGNOSIS — I45.10 INCOMPLETE RBBB: ICD-10-CM

## 2018-02-01 DIAGNOSIS — Z79.899 HIGH RISK MEDICATION USE: ICD-10-CM

## 2018-02-01 DIAGNOSIS — R06.02 SOB (SHORTNESS OF BREATH): ICD-10-CM

## 2018-02-01 DIAGNOSIS — E78.5 HYPERLIPIDEMIA, UNSPECIFIED HYPERLIPIDEMIA TYPE: ICD-10-CM

## 2018-02-01 NOTE — PROGRESS NOTES
HISTORY OF PRESENT ILLNESS  Sisi Coreas is a 62 y.o. male. HPI Comments: Patient with paf,hyperlipidemia  On follow up patient denies any chest pains,sob, palpitation or other significant symptoms. One episode of extreme fatigue while in house-resolved with rest-no recurrence    Hypertension   Pertinent negatives include no chest pain, no abdominal pain, no headaches and no shortness of breath. Cholesterol Problem   The history is provided by the patient. This is a chronic problem. The problem occurs constantly. The problem has not changed since onset. Pertinent negatives include no chest pain, no abdominal pain, no headaches and no shortness of breath. Palpitations    The history is provided by the patient. This is a new problem. The problem has been resolved. The problem is associated with nothing. Pertinent negatives include no fever, no chest pain, no claudication, no orthopnea, no PND, no abdominal pain, no nausea, no vomiting, no headaches, no dizziness, no weakness, no cough, no hemoptysis, no shortness of breath and no sputum production. His past medical history is significant for hypertension. Review of Systems   Constitutional: Negative for chills and fever. HENT: Negative for nosebleeds. Eyes: Negative for blurred vision and double vision. Respiratory: Negative for cough, hemoptysis, sputum production, shortness of breath and wheezing. Cardiovascular: Positive for palpitations. Negative for chest pain, orthopnea, claudication, leg swelling and PND. Gastrointestinal: Negative for abdominal pain, heartburn, nausea and vomiting. Musculoskeletal: Negative for myalgias. Skin: Negative for rash. Neurological: Negative for dizziness, weakness and headaches. Endo/Heme/Allergies: Does not bruise/bleed easily.      Family History   Problem Relation Age of Onset    Stroke Father        Past Medical History:   Diagnosis Date    Hyperlipidemia        Past Surgical History: Procedure Laterality Date    HX HERNIA REPAIR         Social History   Substance Use Topics    Smoking status: Former Smoker     Types: Cigarettes     Quit date: 3/9/2013    Smokeless tobacco: Never Used    Alcohol use 0.0 oz/week     0 Standard drinks or equivalent per week      Comment: OCC        No Known Allergies    Outpatient Prescriptions Marked as Taking for the 2/1/18 encounter (Office Visit) with Michelle Singletary MD   Medication Sig Dispense Refill    CALCIUM CARBONATE (CALCARB 600 PO) Take  by mouth.  ascorbic acid, vitamin C, (VITAMIN C) 500 mg tablet Take 1,000 mg by mouth.  fish oil-dha-epa 1,200-144-216 mg cap Take  by mouth.  multivitamin (ONE A DAY) tablet Take 1 Tab by mouth daily.  amiodarone (CORDARONE) 200 mg tablet Take 1 Tab by mouth daily. 30 Tab 6    apixaban (ELIQUIS) 5 mg tablet Take 1 Tab by mouth two (2) times a day. 60 Tab 6    atorvastatin (LIPITOR) 20 mg tablet Take  by mouth daily. Visit Vitals    /83    Pulse 70    Ht 6' 1\" (1.854 m)    Wt 123.8 kg (273 lb)    BMI 36.02 kg/m2         Physical Exam   Constitutional: He is oriented to person, place, and time. He appears well-developed and well-nourished. HENT:   Head: Normocephalic and atraumatic. Eyes: Conjunctivae are normal.   Neck: Neck supple. No JVD present. No tracheal deviation present. No thyromegaly present. Cardiovascular: Normal rate and normal heart sounds. An irregularly irregular rhythm present. Exam reveals no gallop and no friction rub. No murmur heard. Pulmonary/Chest: Breath sounds normal. No respiratory distress. He has no wheezes. He has no rales. He exhibits no tenderness. Abdominal: Soft. There is no tenderness. Musculoskeletal: He exhibits no edema. Neurological: He is alert and oriented to person, place, and time. Skin: Skin is warm and dry. Psychiatric: He has a normal mood and affect.        Mr. Toi Guillermo has a reminder for a \"due or due soon\" health maintenance. I have asked that he contact his primary care provider for follow-up on this health maintenance. I have personally reviewed patient's records available from hospital and other providers and incorporated findings in patient care. pcp-3/2016  SUMMARY:echo:2016  Left ventricle: Systolic function was normal. Ejection fraction was  estimated to be 60 %. No obvious wall motion abnormalities identified in  the views obtained. There was mild concentric hypertrophy. Doppler  parameters were consistent with abnormal left ventricular relaxation  (grade 1 diastolic dysfunction). Mitral valve: There was trivial regurgitation. NUCLEAR IMAGIN2016     Findings:   1. Stress images reveal normal Myoview distrubution in all the LV segments in short axis, vertical and horizontal long axis views. 2. Resting images have a normal uptake. 3. Gated images reveal normal wall motion and the ejection fraction is calculated to be 85%. Conclusion:   1. Normal perfusion scan. 2. Normal wall motion and ejection fraction. 3. Low risk scan.  2016-event monitor  Sr,st,no afib,? Svt  2018  1. Persistent atrial fibrillation (HCC) [I48.1]   Post-procedure Diagnoses:   1. Atrial fibrillation, currently in sinus rhythm [Z86.79]   Procedures:   1. CARDIOVERSION, ELECTIVE [[id number]]      []Hide copied text  []Hover for attribution information  Sedation provided by anesthesiology  200J x 1 DCCV provided. Patient converted to NSR  No complications         5729  Atrial fibrillation   -Incomplete right bundle branch block. ABNORMAL   Assessment         ICD-10-CM ICD-9-CM    1. Persistent atrial fibrillation (HCC) I48.1 427.31     recurrent post cardioversion  refer to ep   2. Hyperlipidemia, unspecified hyperlipidemia type E78.5 272.4     on statin   3. Incomplete RBBB I45.10 426.4     old   4. SOB (shortness of breath) R06.02 786.05 AMB POC EKG ROUTINE W/ 12 LEADS, INTER & REP    stable  exertional   5. History of cardioversion Z98.890 V15.1 REFERRAL TO CARDIAC ELECTROPHYSIOLOGY    1/2018  recurrent a fib-2/2018   6. High risk medication use Z79.899 V58.69     on amiodarone   chads-vasc score low  Will use asa every day    11/2017  Started eliquis for recurrent af-stopped asa  ? Antiarrhythmic in 4 weeks  I have discussed risk benefit and option of use of amiodarone for arrythmia. Risk of toxicity with medication are informed. Patient will require careful monitoring. 12/2017-persistant a fib  Started amiodarone-cardioversion in 4 weeks  1/2018  Cardioverted to sr  2/2018  Recurrent a fib  Awaiting sleep study result  Discussed ablation-  There are no discontinued medications. Orders Placed This Encounter    REFERRAL TO CARDIAC ELECTROPHYSIOLOGY     Referral Priority:   Routine     Referral Type:   Consultation     Referral Reason:   Specialty Services Required     Referred to Provider:   Lauryn Chang MD    AMB POC EKG ROUTINE W/ 12 LEADS, INTER & REP     Order Specific Question:   Reason for Exam:     Answer:   hypertension       Follow-up Disposition:  Return in about 4 months (around 6/1/2018).

## 2018-02-01 NOTE — PROGRESS NOTES
1. Have you been to the ER, urgent care clinic since your last visit? Hospitalized since your last visit? No    2. Have you seen or consulted any other health care providers outside of the [address] [place] [address] since your last visit? Include any pap smears or colon screening. No     3. Since your last visit, have you had any of the following symptoms? .           4. Have you had any blood work, X-rays or cardiac testing? No             5.  Where do you normally have your labs drawn? [facility]    6. Do you need any refills today?    No

## 2018-02-01 NOTE — LETTER
Romana Horning 1960 2/1/2018 Dear TERRANCE San I had the pleasure of evaluating  Mr. Safia Nguyen in office today. Below are the relevant portions of my assessment and plan of care. ICD-10-CM ICD-9-CM 1. Persistent atrial fibrillation (HCC) I48.1 427.31   
 recurrent post cardioversion 
refer to ep 2. Hyperlipidemia, unspecified hyperlipidemia type E78.5 272.4   
 on statin 3. Incomplete RBBB I45.10 426.4   
 old 4. SOB (shortness of breath) R06.02 786.05 AMB POC EKG ROUTINE W/ 12 LEADS, INTER & REP  
 stable 
exertional  
5. History of cardioversion Z98.890 V15.1 REFERRAL TO CARDIAC ELECTROPHYSIOLOGY  
 1/2018 
recurrent a fib-2/2018 6. High risk medication use Z79.899 V58.69   
 on amiodarone Current Outpatient Prescriptions Medication Sig Dispense Refill  CALCIUM CARBONATE (CALCARB 600 PO) Take  by mouth.  ascorbic acid, vitamin C, (VITAMIN C) 500 mg tablet Take 1,000 mg by mouth.  fish oil-dha-epa 1,200-144-216 mg cap Take  by mouth.  multivitamin (ONE A DAY) tablet Take 1 Tab by mouth daily.  amiodarone (CORDARONE) 200 mg tablet Take 1 Tab by mouth daily. 30 Tab 6  
 apixaban (ELIQUIS) 5 mg tablet Take 1 Tab by mouth two (2) times a day. 60 Tab 6  
 atorvastatin (LIPITOR) 20 mg tablet Take  by mouth daily. Orders Placed This Encounter  REFERRAL TO CARDIAC ELECTROPHYSIOLOGY Referral Priority:   Routine Referral Type:   Consultation Referral Reason:   Specialty Services Required Referred to Provider:   Salvador Bowen MD  
 AMB POC EKG ROUTINE W/ 12 LEADS, INTER & REP Order Specific Question:   Reason for Exam: Answer:   hypertension If you have questions, please do not hesitate to call me. I look forward to following Mr. Safia Nguyen along with you. Sincerely, Connor Pedraza MD

## 2018-02-01 NOTE — MR AVS SNAPSHOT
[address] [facility] 
 
 
 [address] [facility] 
[phone] Patient: Romana Horning MRN: [id number] Visit Information Date & Time Provider Department Dept. Phone Encounter #  
 2/1/2018 11:00 AM Connor Pedraza MD [facility] [id number] Follow-up Instructions Return in about 4 months (around 6/1/2018). Your Appointments 3/1/2018 12:40 PM  
NEW ELECTROPHYSIOLOGY with Salvador Bowen MD  
[facility] ([address]) Appt Note: NEP refer by AMIE Ortiz DX: History of cardioversion/see 311 [facility] [address] [address] [address] [phone] [place] [phone]  
  
    
 6/5/2018 10:45 AM  
Follow Up with Connor Pedraza MD  
[facility] ([facility]) Appt Note: 4 month [address] [place] [address] [address]  
  
   
 [address] [address] [address] [address] Upcoming Health Maintenance Date Due Hepatitis C Screening 1960 DTaP/Tdap/Td series (1 - Tdap) 6/4/1981 FOBT Q 1 YEAR AGE 50-75 6/4/2010 Influenza Age 5 to Adult 8/1/2017 Allergies as of 2/1/2018  Review Complete On: 2/1/2018 By: Connor Pedraza MD  
 No Known Allergies Current Immunizations  Never Reviewed No immunizations on file. Not reviewed this visit You Were Diagnosed With   
  
 Codes Comments Persistent atrial fibrillation (HCC)    -  Primary ICD-10-CM: I48.1 ICD-9-CM: 427.31 recurrent post cardioversion 
refer to ep Hyperlipidemia, unspecified hyperlipidemia type     ICD-10-CM: E78.5 ICD-9-CM: 272.4 on statin Incomplete RBBB     ICD-10-CM: I45.10 ICD-9-CM: 0.4 old  
 SOB (shortness of breath)     ICD-10-CM: R06.02 
ICD-9-CM: 786.05 stable 
exertional  
 History of cardioversion     ICD-10-CM: Z98.890 ICD-9-CM: V15.1 1/2018 recurrent a fib-2/2018 High risk medication use     ICD-10-CM: Z79.899 ICD-9-CM: V58.69 on amiodarone Vitals BP Pulse Height(growth percentile) Weight(growth percentile) BMI Smoking Status 126/83 70 6' 1\" (1.854 m) 273 lb (123.8 kg) 36.02 kg/m2 Former Smoker Vitals History BMI and BSA Data Body Mass Index Body Surface Area 36.02 kg/m 2 2.53 m 2 Preferred Pharmacy Pharmacy Name Phone [id number] [address] [phone] Your Updated Medication List  
  
   
This list is accurate as of: 2/1/18 11:44 AM.  Always use your most recent med list.  
  
  
  
  
 amiodarone 200 mg tablet Commonly known as:  CORDARONE Take 1 Tab by mouth daily. apixaban 5 mg tablet Commonly known as:  Mascorro Govern Take 1 Tab by mouth two (2) times a day. atorvastatin 20 mg tablet Commonly known as:  LIPITOR Take  by mouth daily. CALCARB 600 PO Take  by mouth. fish oil-dha-epa 1,200-144-216 mg Cap Take  by mouth.  
  
 multivitamin tablet Commonly known as:  ONE A DAY Take 1 Tab by mouth daily. VITAMIN C 500 mg tablet Generic drug:  ascorbic acid (vitamin C) Take 1,000 mg by mouth. We Performed the Following AMB POC EKG ROUTINE W/ 12 LEADS, INTER & REP [61890 CPT(R)] REFERRAL TO CARDIAC ELECTROPHYSIOLOGY [REF11 Custom] Follow-up Instructions Return in about 4 months (around 6/1/2018). Referral Information Referral ID Referred By Referred To  
  
 [id number] ASHLYN [address] [place], [place] [address]MD Chiquita Morales [facility] [address] [facility] [place], [address] [place] [address] Str. Phone: [phone] Fax: [phone] Visits Status Start Date End Date 1 New Request 2/1/18 2/1/19 If your referral has a status of pending review or denied, additional information will be sent to support the outcome of this decision. Introducing [facility] & HEALTH SERVICES! Ruth Ann Simpson introduces [employer] patient portal. Now you can access parts of your medical record, email your doctor's office, and request medication refills online. 1. In your internet browser, go to [email or web address] 2. Click on the First Time User? Click Here link in the Sign In box. You will see the New Member Sign Up page. 3. Enter your [employer] Access Code exactly as it appears below. You will not need to use this code after youve completed the sign-up process. If you do not sign up before the expiration date, you must request a new code. · [employer] Access Code: [email or web address] Expires: 2/7/2018 10:34 AM 
 
4. Enter the last four digits of your Social Security Number (xxxx) and Date of Birth (mm/dd/yyyy) as indicated and click Submit. You will be taken to the next sign-up page. 5. Create a [employer] ID. This will be your [employer] login ID and cannot be changed, so think of one that is secure and easy to remember. 6. Create a [employer] password. You can change your password at any time. 7. Enter your Password Reset Question and Answer. This can be used at a later time if you forget your password. 8. Enter your e-mail address. You will receive e-mail notification when new information is available in [address] 9. Click Sign Up. You can now view and download portions of your medical record. 10. Click the Download Summary menu link to download a portable copy of your medical information. If you have questions, please visit the Frequently Asked Questions section of the [employer] website. Remember, [employer] is NOT to be used for urgent needs. For medical emergencies, dial 911. Now available from your iPhone and Android! Please provide this summary of care documentation to your next provider. Your primary care clinician is listed as Yanelis Joseph. If you have any questions after today's visit, please call [phone].

## 2018-03-01 ENCOUNTER — OFFICE VISIT (OUTPATIENT)
Dept: CARDIOLOGY CLINIC | Age: 58
End: 2018-03-01

## 2018-03-01 VITALS — HEIGHT: 73 IN | SYSTOLIC BLOOD PRESSURE: 136 MMHG | HEART RATE: 83 BPM | DIASTOLIC BLOOD PRESSURE: 76 MMHG

## 2018-03-01 DIAGNOSIS — I48.0 PAF (PAROXYSMAL ATRIAL FIBRILLATION) (HCC): Primary | ICD-10-CM

## 2018-03-01 DIAGNOSIS — Z79.899 HIGH RISK MEDICATION USE: ICD-10-CM

## 2018-03-01 DIAGNOSIS — E78.5 HYPERLIPIDEMIA, UNSPECIFIED HYPERLIPIDEMIA TYPE: ICD-10-CM

## 2018-03-01 DIAGNOSIS — R60.9 EDEMA, UNSPECIFIED TYPE: ICD-10-CM

## 2018-03-01 DIAGNOSIS — Z98.890 HISTORY OF CARDIOVERSION: ICD-10-CM

## 2018-03-01 DIAGNOSIS — I45.10 INCOMPLETE RBBB: ICD-10-CM

## 2018-03-01 NOTE — PROGRESS NOTES
1. Have you been to the ER, urgent care clinic since your last visit? Hospitalized since your last visit? No     2. Have you seen or consulted any other health care providers outside of the [address] [place] [address] since your last visit? Include any pap smears or colon screening.  No

## 2018-03-01 NOTE — PROGRESS NOTES
History of Present Illness:  A 62 y.o. male referred by Dr. Aarti Quick for evaluation of paroxysmal atrial fibrillation. He was diagnosed a few years ago. Upon pressing him for symptoms, he does note he gets a bit more winded at times and notes some occasional palpitations when he is in atrial fibrillation. He had been treated with Amiodarone and cardioversion in January but had subsequent recurrence. He denies any syncope, PND, orthopnea, edema. He was also recently diagnosed with sleep apnea and is planning to get fitted for a sleep apnea machine. Impression/Plan:   Paroxysmal symptomatic atrial fibrillation with symptoms of mild shortness of breath and palpitations. History of cardioversion as well as Amiodarone with recurrence. Recent new diagnosis of sleep apnea obtaining a sleep machine. Previous echocardiogram with normal function. Hypertension. Chronic Eliquis use. Dyslipidemia. I discussed risks, benefits and alternatives of rate control vs rhythm control vs possible ablation. All questions answered. Given his younger age and recurrence, he has opted for ablation with pulmonary vein isolation. I discussed the procedure at length. He would like to proceed. I discussed depending upon the size of his atrium he may require a redo procedure and he understands this. We also discussed other types of rhythm such as atrial tachycardia and flutter. I will plan to hold Eliquis 24 hours prior to the procedure. I am also going to obtain an echocardiogram to evaluate his left atrial size. Thank you kindly for allowing me to participate in this patient's care. Total care time spent in reviewing the case, multiple EMR databases, physician notes, procedure notes, examining the patient, and documentation, is 45 minutes.      Discussed in details with patient at bedside. All questions were answered to their full satisfaction. Risk, benefit and alternatives were discussed.   More than 50% time spent in counseling and coordination of care. Past Medical History:   Diagnosis Date    Hyperlipidemia        Current Outpatient Prescriptions   Medication Sig Dispense Refill    CALCIUM CARBONATE (CALCARB 600 PO) Take  by mouth.  ascorbic acid, vitamin C, (VITAMIN C) 500 mg tablet Take 1,000 mg by mouth.  fish oil-dha-epa 1,200-144-216 mg cap Take  by mouth.  multivitamin (ONE A DAY) tablet Take 1 Tab by mouth daily.  amiodarone (CORDARONE) 200 mg tablet Take 1 Tab by mouth daily. 30 Tab 6    apixaban (ELIQUIS) 5 mg tablet Take 1 Tab by mouth two (2) times a day. 60 Tab 6    atorvastatin (LIPITOR) 20 mg tablet Take  by mouth daily. Social History   reports that he quit smoking about 4 years ago. His smoking use included Cigarettes. He has never used smokeless tobacco.   reports that he drinks alcohol. Family History  family history includes Stroke in his father. Review of Systems  Except as stated above include:  Constitutional: Negative for fever, chills and malaise/fatigue. HEENT: No congestion or recent URI. Gastrointestinal: No nausea, vomiting, abdominal pain, bloody stools. Pulmonary:  Negative except as stated above. Cardiac:  Negative except as stated above. Musculoskeletal: Negative except as stated above. Neurological:  No localized symptoms. Skin:  Negative except as stated above. Psych:  No mood swings. Endocrine:  No heat/cold intolerance. PHYSICAL EXAM  BP Readings from Last 3 Encounters:   03/01/18 136/76   02/01/18 126/83   01/12/18 113/65     Pulse Readings from Last 3 Encounters:   03/01/18 83   02/01/18 70   01/12/18 78     Wt Readings from Last 3 Encounters:   02/01/18 123.8 kg (273 lb)   01/12/18 118.8 kg (262 lb)   12/08/17 121.1 kg (267 lb)     General:   Well developed, well groomed. Head/Neck:   No jugular venous distention     No carotid bruits. No evidence of xanthelasma. Lungs:   No respiratory distress.       Clear bilaterally. Heart:    Irreg irreg. Normal S1/S2. Palpation of heart with normal point of maximum impulse. No significant murmurs, rubs or gallops. Abdomen:   Soft and nontender. No palpable abdominal mass or bruits. Extremities:   Intact peripheral pulses. No significant edema. Neurological:   Alert and oriented to person, place, time. No focal neurological deficit visually. Blood Pressure Metric:  Prince Riddle has been given the following recommendations today due to his elevated BP reading: lifestyle modifications to include: dietary sodium restriction.

## 2018-03-01 NOTE — PATIENT INSTRUCTIONS
Learning About Catheter Ablation for Heart Rhythm Problems  What is catheter ablation? Catheter ablation is a procedure that treats heart rhythm problems. These problems include atrial fibrillation, supraventricular tachycardia (SVT), atrial flutter, and ventricular tachycardia. Your heart should have a strong, steady beat. That beat is controlled by the heart's electrical system. Sometimes that system misfires. This causes a heartbeat that is too fast and isn't steady. Catheter ablation is a way to get into your heart and fix the problem. Ablation is not surgery. How is catheter ablation done? Your doctor inserts thin tubes called catheters into a blood vessel in your groin, arm, or neck. Then your doctor feeds them into the heart. Wires in the catheters help the doctor find the problem areas. Then he or she uses the wires to send energy to destroy the tiny areas of heart tissue that are causing the problems. It may seem like a bad idea to destroy parts of your heart on purpose. But the areas that are destroyed are very tiny. They should not affect your heart's ability to do its job. You may be awake during the procedure. Or you may be asleep. The doctor will give you medicines to help you feel relaxed and to numb the areas where the catheters go in. You may feel a little uncomfortable, but you should not feel pain. This procedure usually takes 2 to 6 hours. In rare cases, it can take longer. You may stay overnight in the hospital. How long you stay in the hospital depends on the type of ablation you have. What can you expect after catheter ablation? Do not exercise hard or lift anything heavy for a week. You will probably be able to go back to work and to your normal routine in 1 or 2 days. You may have swelling, bruising, or a small lump around the site where the catheters went into your body. These should go away in 3 to 4 weeks. You may have to take some medicines for a while.   Follow-up care is a key part of your treatment and safety. Be sure to make and go to all appointments, and call your doctor if you are having problems. It's also a good idea to know your test results and keep a list of the medicines you take. Where can you learn more? Go to http://josé-angelo.info/. Enter G711 in the search box to learn more about \"Learning About Catheter Ablation for Heart Rhythm Problems. \"  Current as of: September 21, 2016  Content Version: 11.4  © 1663-7251 Healthwise, Incorporated. Care instructions adapted under license by [employer] (which disclaims liability or warranty for this information). If you have questions about a medical condition or this instruction, always ask your healthcare professional. [address] any warranty or liability for your use of this information.

## 2018-03-01 NOTE — MR AVS SNAPSHOT
[address] [address] [address] [address] [phone] Patient: Ashley Beavers MRN: [id number] Visit Information Date & Time Provider Department Dept. Phone Encounter #  
 3/1/2018 12:40 PM Dimas Gregorio MD [facility] [phone] Your Appointments 6/5/2018 10:45 AM  
Follow Up with Joanna Weeks MD  
[facility] ([facility]) Appt Note: 4 month [address] [facility] [address]  
  
   
 [address] [address] [address] [address] Upcoming Health Maintenance Date Due Hepatitis C Screening 1960 DTaP/Tdap/Td series (1 - Tdap) 6/4/1981 FOBT Q 1 YEAR AGE 50-75 6/4/2010 Influenza Age 5 to Adult 8/1/2017 Allergies as of 3/1/2018  Review Complete On: 3/1/2018 By: Dimas Gregorio MD  
 No Known Allergies Current Immunizations  Never Reviewed No immunizations on file. Not reviewed this visit You Were Diagnosed With   
  
 Codes Comments PA[facility] (paroxysmal atrial fibrillation) ([facility]    -  Primary ICD-10-CM: I48.0 ICD-9-CM: 427.31 Incomplete RBBB     ICD-10-CM: I45.10 ICD-9-CM: 426.4 History of cardioversion     ICD-10-CM: Z98.890 ICD-9-CM: V15.1 Hyperlipidemia, unspecified hyperlipidemia type     ICD-10-CM: E78.5 ICD-9-CM: 272.4 Edema, unspecified type     ICD-10-CM: R60.9 ICD-9-CM: 782.3 High risk medication use     ICD-10-CM: Z79.899 ICD-9-CM: V58.69 Vitals BP Pulse Height(growth percentile) Smoking Status 136/76 83 6' 1\" (1.854 m) Former Smoker Preferred Pharmacy Pharmacy Name Phone [id number] - [address] [phone] Your Updated Medication List  
  
   
This list is accurate as of 3/1/18  1:15 PM.  Always use your most recent med list.  
  
  
  
  
 amiodarone 200 mg tablet Commonly known as:  CORDARONE Take 1 Tab by mouth daily. apixaban 5 mg tablet Commonly known as:  Angela Hopping Take 1 Tab by mouth two (2) times a day. atorvastatin 20 mg tablet Commonly known as:  LIPITOR Take  by mouth daily. CALCARB 600 PO Take  by mouth. fish oil-dha-epa 1,200-144-216 mg Cap Take  by mouth.  
  
 multivitamin tablet Commonly known as:  ONE A DAY Take 1 Tab by mouth daily. VITAMIN C 500 mg tablet Generic drug:  ascorbic acid (vitamin C) Take 1,000 mg by mouth. We Performed the Following AMB POC EKG ROUTINE W/ 12 LEADS, INTER & REP [52710 CPT(R)] Patient Instructions Learning About Catheter Ablation for Heart Rhythm Problems What is catheter ablation? Catheter ablation is a procedure that treats heart rhythm problems. These problems include atrial fibrillation, supraventricular tachycardia (SVT), atrial flutter, and ventricular tachycardia. Your heart should have a strong, steady beat. That beat is controlled by the heart's electrical system. Sometimes that system misfires. This causes a heartbeat that is too fast and isn't steady. Catheter ablation is a way to get into your heart and fix the problem. Ablation is not surgery. How is catheter ablation done? Your doctor inserts thin tubes called catheters into a blood vessel in your groin, arm, or neck. Then your doctor feeds them into the heart. Wires in the catheters help the doctor find the problem areas. Then he or she uses the wires to send energy to destroy the tiny areas of heart tissue that are causing the problems. It may seem like a bad idea to destroy parts of your heart on purpose. But the areas that are destroyed are very tiny. They should not affect your heart's ability to do its job. You may be awake during the procedure. Or you may be asleep.  The doctor will give you medicines to help you feel relaxed and to numb the areas where the catheters go in. You may feel a little uncomfortable, but you should not feel pain. This procedure usually takes 2 to 6 hours. In rare cases, it can take longer. You may stay overnight in the hospital. How long you stay in the hospital depends on the type of ablation you have. What can you expect after catheter ablation? Do not exercise hard or lift anything heavy for a week. You will probably be able to go back to work and to your normal routine in 1 or 2 days. You may have swelling, bruising, or a small lump around the site where the catheters went into your body. These should go away in 3 to 4 weeks. You may have to take some medicines for a while. Follow-up care is a key part of your treatment and safety. Be sure to make and go to all appointments, and call your doctor if you are having problems. It's also a good idea to know your test results and keep a list of the medicines you take. Where can you learn more? Go to http://josé-angelo.info/. Enter T624 in the search box to learn more about \"Learning About Catheter Ablation for Heart Rhythm Problems. \" Current as of: September 21, 2016 Content Version: 11.4 © 8286-3701 The car easily beat. Care instructions adapted under license by Your Office Agent (which disclaims liability or warranty for this information). If you have questions about a medical condition or this instruction, always ask your healthcare professional. [place] [address] any warranty or liability for your use of this information. Introducing [facility] & HEALTH SERVICES! [employer] introduces [employer] patient portal. Now you can access parts of your medical record, email your doctor's office, and request medication refills online. 1. In your internet browser, go to [email or web address] 2. Click on the First Time User? Click Here link in the Sign In box. You will see the New Member Sign Up page. 3. Enter your [employer] Found! Access Code exactly as it appears below. You will not need to use this code after youve completed the sign-up process. If you do not sign up before the expiration date, you must request a new code. · [employer]! Access Code: [email or web address] Expires: 5/30/2018 12:22 PM 
 
4. Enter the last four digits of your Social Security Number (xxxx) and Date of Birth (mm/dd/yyyy) as indicated and click Submit. You will be taken to the next sign-up page. 5. Create a 404 Found! ID. This will be your [employer] Found! login ID and cannot be changed, so think of one that is secure and easy to remember. 6. Create a 404 Found! password. You can change your password at any time. 7. Enter your Password Reset Question and Answer. This can be used at a later time if you forget your password. 8. Enter your e-mail address. You will receive e-mail notification when new information is available in [address] 9. Click Sign Up. You can now view and download portions of your medical record. 10. Click the Download Summary menu link to download a portable copy of your medical information. If you have questions, please visit the Frequently Asked Questions section of the [employer] website. Remember, [employer]! is NOT to be used for urgent needs. For medical emergencies, dial 911. Now available from your iPhone and Android! Please provide this summary of care documentation to your next provider. Your primary care clinician is listed as Karen Fleming. If you have any questions after today's visit, please call [phone].

## 2018-03-27 ENCOUNTER — OFFICE VISIT (OUTPATIENT)
Dept: CARDIOLOGY CLINIC | Age: 58
End: 2018-03-27

## 2018-03-27 ENCOUNTER — HOSPITAL ENCOUNTER (OUTPATIENT)
Dept: CT IMAGING | Age: 58
Discharge: HOME | End: 2018-03-27
Attending: INTERNAL MEDICINE
Payer: COMMERCIAL

## 2018-03-27 ENCOUNTER — HOSPITAL ENCOUNTER (OUTPATIENT)
Dept: LAB | Age: 58
Discharge: HOME | End: 2018-03-27
Payer: COMMERCIAL

## 2018-03-27 DIAGNOSIS — I48.0 PAF (PAROXYSMAL ATRIAL FIBRILLATION) (HCC): Primary | ICD-10-CM

## 2018-03-27 DIAGNOSIS — I48.0 PAF (PAROXYSMAL ATRIAL FIBRILLATION) (HCC): ICD-10-CM

## 2018-03-27 LAB
ALBUMIN SERPL-MCNC: 4.1 G/DL (ref 3.4–5)
ALBUMIN/GLOB SERPL: 1.2 {RATIO} (ref 0.8–1.7)
ALP SERPL-CCNC: 113 U/L (ref 45–117)
ALT SERPL-CCNC: 52 U/L (ref 16–61)
ANION GAP SERPL CALC-SCNC: 5 MMOL/L (ref 3–18)
AST SERPL-CCNC: 41 U/L (ref 15–37)
BASOPHILS # BLD: 0 K/UL (ref 0–0.06)
BASOPHILS NFR BLD: 0 % (ref 0–2)
BILIRUB SERPL-MCNC: 0.6 MG/DL (ref 0.2–1)
BUN SERPL-MCNC: 22 MG/DL (ref 7–18)
BUN/CREAT SERPL: 19 (ref 12–20)
CALCIUM SERPL-MCNC: 9.6 MG/DL (ref 8.5–10.1)
CHLORIDE SERPL-SCNC: 106 MMOL/L (ref 100–108)
CO2 SERPL-SCNC: 31 MMOL/L (ref 21–32)
CREAT SERPL-MCNC: 1.17 MG/DL (ref 0.6–1.3)
DIFFERENTIAL METHOD BLD: ABNORMAL
EOSINOPHIL # BLD: 0.1 K/UL (ref 0–0.4)
EOSINOPHIL NFR BLD: 3 % (ref 0–5)
ERYTHROCYTE [DISTWIDTH] IN BLOOD BY AUTOMATED COUNT: 13 % (ref 11.6–14.5)
GLOBULIN SER CALC-MCNC: 3.3 G/DL (ref 2–4)
GLUCOSE SERPL-MCNC: 112 MG/DL (ref 74–99)
HCT VFR BLD AUTO: 46.9 % (ref 36–48)
HGB BLD-MCNC: 16.5 G/DL (ref 13–16)
INR PPP: 1 (ref 0.8–1.2)
LYMPHOCYTES # BLD: 1.7 K/UL (ref 0.9–3.6)
LYMPHOCYTES NFR BLD: 33 % (ref 21–52)
MCH RBC QN AUTO: 32 PG (ref 24–34)
MCHC RBC AUTO-ENTMCNC: 35.2 G/DL (ref 31–37)
MCV RBC AUTO: 91.1 FL (ref 74–97)
MONOCYTES # BLD: 0.6 K/UL (ref 0.05–1.2)
MONOCYTES NFR BLD: 11 % (ref 3–10)
NEUTS SEG # BLD: 2.9 K/UL (ref 1.8–8)
NEUTS SEG NFR BLD: 53 % (ref 40–73)
PLATELET # BLD AUTO: 180 K/UL (ref 135–420)
PMV BLD AUTO: 11.5 FL (ref 9.2–11.8)
POTASSIUM SERPL-SCNC: 4.8 MMOL/L (ref 3.5–5.5)
PROT SERPL-MCNC: 7.4 G/DL (ref 6.4–8.2)
PROTHROMBIN TIME: 12.8 SEC (ref 11.5–15.2)
RBC # BLD AUTO: 5.15 M/UL (ref 4.7–5.5)
SODIUM SERPL-SCNC: 142 MMOL/L (ref 136–145)
WBC # BLD AUTO: 5.3 K/UL (ref 4.6–13.2)

## 2018-03-27 PROCEDURE — 80053 COMPREHEN METABOLIC PANEL: CPT | Performed by: INTERNAL MEDICINE

## 2018-03-27 PROCEDURE — 75572 CT HRT W/3D IMAGE: CPT

## 2018-03-27 PROCEDURE — 85610 PROTHROMBIN TIME: CPT | Performed by: INTERNAL MEDICINE

## 2018-03-27 PROCEDURE — 85025 COMPLETE CBC W/AUTO DIFF WBC: CPT | Performed by: INTERNAL MEDICINE

## 2018-03-27 PROCEDURE — 36415 COLL VENOUS BLD VENIPUNCTURE: CPT | Performed by: INTERNAL MEDICINE

## 2018-03-27 PROCEDURE — 74011636320 HC RX REV CODE- 636/320: Performed by: INTERNAL MEDICINE

## 2018-03-27 RX ADMIN — IOPAMIDOL 125 ML: 755 INJECTION, SOLUTION INTRAVENOUS at 09:00

## 2018-03-27 NOTE — MR AVS SNAPSHOT
[address] [place] [address] [address] [facility] Degree [id number] [phone] Patient: Angus Rick MRN: [id number] WBZ:2/[birth year]/9232 Visit Information Date & Time Provider Department Dept. Phone Encounter #  
 3/27/2018  8:30 AM Bp [facility] [facility] [id number] Your Appointments 3/27/2018  8:30 AM  
PRE PROCEDURE with Bp [facility] ([address]) Appt Note: KYRA/AFib Ablation on 4/3; pt r/s due to conflict with CT Scan appt. Oscarstevensivakumar [place] Degree [id number] [phone] [place] [phone]  
  
    
 6/5/2018 10:45 AM  
Follow Up with Jovita Newell MD  
[facility] [place] ([address] [facility]) Appt Note: 4 month [address] [place] [address] [facility] [address]  
  
   
 [address] [facility] Degree [address] Upcoming Health Maintenance Date Due Hepatitis C Screening 1960 DTaP/Tdap/Td series (1 - Tdap) 6/4/1981 FOBT Q 1 YEAR AGE 50-75 6/4/2010 Influenza Age 5 to Adult 8/1/2017 Allergies as of 3/27/2018  Review Complete On: 3/1/2018 By: Jessica Berry MD  
 No Known Allergies Current Immunizations  Never Reviewed No immunizations on file. Not reviewed this visit You Were Diagnosed With   
  
 Codes Comments PAF (paroxysmal atrial fibrillation) ([facility]    -  Primary ICD-10-CM: I48.0 ICD-9-CM: 427.31 Vitals Smoking Status Former Smoker Preferred Pharmacy Pharmacy Name Phone [id number] - [address] [phone] Your Updated Medication List  
  
   
This list is accurate as of 3/27/18  7:26 AM.  Always use your most recent med list.  
  
  
  
  
 amiodarone 200 mg tablet Commonly known as:  CORDARONE Take 1 Tab by mouth daily. apixaban 5 mg tablet Commonly known as:  Jing Broad Take 1 Tab by mouth two (2) times a day. atorvastatin 20 mg tablet Commonly known as:  LIPITOR Take  by mouth daily. CALCARB 600 PO Take  by mouth. fish oil-dha-epa 1,200-144-216 mg Cap Take  by mouth.  
  
 multivitamin tablet Commonly known as:  ONE A DAY Take 1 Tab by mouth daily. VITAMIN C 500 mg tablet Generic drug:  ascorbic acid (vitamin C) Take 1,000 mg by mouth. To-Do List   
 03/27/2018 Lab:  CBC WITH AUTOMATED DIFF   
  
 03/27/2018 Lab:  METABOLIC PANEL, COMPREHENSIVE   
  
 03/27/2018 Lab:  PROTHROMBIN TIME + INR   
  
 03/27/2018 9:30 AM  
  Appointment with [facility] CT RM 1 at [facility] RAD CT ([phone]) GENERAL INSTRUCTIONS  This study does not require you to drink contrast prior to your study. STUDY DETAILS Patient must be NPO (nothing to eat/drink, including meds and water) for 4 hours prior to study. RELATED STUDY INFORMATION  Bring any films, CDs, and reports related with you on the day of your exam.  This only includes studies done outside of [address] [place] [address], [place], and [facility]. QUESTIONS  Notify the CT Department if you have any questions concerning your study. [place] [id number]  
  
 04/03/2018 10:30 AM  
  Appointment with [facility] ANESTHESIA 2; [facility] CATH SPECIAL PROCEDURE ROOM; [facility] CATH HOLDING; [facility] [address] LAB RM 1 at [facility] NON-INVASIVE CARD ([phone]) Patient Instructions DR. [facility] Patient  EP Instructions 1. You are scheduled to have a KYRA and Possible Afib Ablation on  April 3, 2018 , at 1030 am.    Please check in at 0930 am. 
 
2. Please go to DR. BIG[facility] STOUT and park in the outpatient parking lot that is located around to the back of the hospital and enter through the [employer]. Once you enter through the [facility] check in with the  there. The  will either give you directions or assist you in getting to the cath holding area. 3.  [x]       You are not to eat or drink anything after midnight the morning of the  
            procedure. 4. Please continue to take your medications with a small sip of water on the morning of the procedure with the following exceptions:  Hold Eliquis on April 2, 2018. 5. If you are diabetic, do not take your insulin/sugar pill the morning of the procedure. 6. We encourage families to wait in the waiting room on the first floor while the procedure is being done. The Doctor will come out and talk with you as soon as the procedure is over. 7. There is the possibility that you may spend the night in the hospital, depending on the results of the procedure. This will be determined after the procedure is done. 8.   If you or your family have any questions, please call our office Monday-Friday 9:00am  
      -4:30 pm , at [phone], and ask to speak to one of the nurses. Introducing [facility]! Debbora Form introduces [employer] patient portal. Now you can access parts of your medical record, email your doctor's office, and request medication refills online. 1. In your internet browser, go to [email or web address] 2. Click on the First Time User? Click Here link in the Sign In box. You will see the New Member Sign Up page. 3. Enter your [employer] Access Code exactly as it appears below. You will not need to use this code after youve completed the sign-up process. If you do not sign up before the expiration date, you must request a new code. · [employer] Access Code: [email or web address] Expires: 5/30/2018  1:22 PM 
 
4.  Enter the last four digits of your Social Security Number (xxxx) and Date of Birth (mm/dd/yyyy) as indicated and click Submit. You will be taken to the next sign-up page. 5. Create a [employer] ID. This will be your [employer] login ID and cannot be changed, so think of one that is secure and easy to remember. 6. Create a [employer] password. You can change your password at any time. 7. Enter your Password Reset Question and Answer. This can be used at a later time if you forget your password. 8. Enter your e-mail address. You will receive e-mail notification when new information is available in [address] 9. Click Sign Up. You can now view and download portions of your medical record. 10. Click the Download Summary menu link to download a portable copy of your medical information. If you have questions, please visit the Frequently Asked Questions section of the [employer] website. Remember, [employer] is NOT to be used for urgent needs. For medical emergencies, dial 911. Now available from your iPhone and Android! Please provide this summary of care documentation to your next provider. Your primary care clinician is listed as Tanya Valencia. If you have any questions after today's visit, please call [phone].

## 2018-03-27 NOTE — PATIENT INSTRUCTIONS
DR. HER'S \[facility] [place] [facility]\""          Patient  EP Instructions                  1. You are scheduled to have a KYRA and Possible Afib Ablation on  April 3, 2018 , at 1030 am.    Please check in at 0930 am.    2. Please go to DR. HER'JOHN STOUT and kj in the outpatient parking lot that is located around to the back of the hospital and enter through the [employer]. Once you enter through the [facility] check in with the  there. The  will either give you directions or assist you in getting to the cath holding area. 3.  [x]       You are not to eat or drink anything after midnight the morning of the               procedure. 4. Please continue to take your medications with a small sip of water on the morning of the procedure with the following exceptions:  Hold Eliquis on April 2, 2018. 5. If you are diabetic, do not take your insulin/sugar pill the morning of the procedure. 6. We encourage families to wait in the waiting room on the first floor while the procedure is being done. The Doctor will come out and talk with you as soon as the procedure is over. 7. There is the possibility that you may spend the night in the hospital, depending on the results of the procedure. This will be determined after the procedure is done. 8.   If you or your family have any questions, please call our office Monday-Friday 9:00am         -4:30 pm , at [phone], and ask to speak to one of the nurses.

## 2018-04-02 NOTE — H&P
Seen & examined. See full H&P/notes for details. Plan KYRA & possible atrial fibrillation ablation. All risks, benefits, alternatives discussed. All questions answered. Risks included but are not limited to pain, infection, bleeding, stroke, perforation, valve damage, nerve damage, diaphragmatic paralysis, rupture of esophagus and possible fistula from heart to esophagus, deep venous thrombosis, emergent open heart surgery, and death both during procedure and post-operatively. History of Present Illness:  A 62 y.o. male referred by Dr. Flores Anderson for evaluation of paroxysmal atrial fibrillation. He was diagnosed a few years ago. Upon pressing him for symptoms, he does note he gets a bit more winded at times and notes some occasional palpitations when he is in atrial fibrillation. He had been treated with Amiodarone and cardioversion in January but had subsequent recurrence. He denies any syncope, PND, orthopnea, edema. He was also recently diagnosed with sleep apnea and is planning to get fitted for a sleep apnea machine.      Impression/Plan:   Paroxysmal symptomatic atrial fibrillation with symptoms of mild shortness of breath and palpitations. History of cardioversion as well as Amiodarone with recurrence. Recent new diagnosis of sleep apnea obtaining a sleep machine. Previous echocardiogram with normal function. Hypertension. Chronic Eliquis use. Dyslipidemia.     I discussed risks, benefits and alternatives of rate control vs rhythm control vs possible ablation. All questions answered. Given his younger age and recurrence, he has opted for ablation with pulmonary vein isolation. I discussed the procedure at length. He would like to proceed. I discussed depending upon the size of his atrium he may require a redo procedure and he understands this. We also discussed other types of rhythm such as atrial tachycardia and flutter. I will plan to hold Eliquis 24 hours prior to the procedure.  I am also going to obtain an echocardiogram to evaluate his left atrial size.      Thank you kindly for allowing me to participate in this patient's care.       Total care time spent in reviewing the case, multiple EMR databases, physician notes, procedure notes, examining the patient, and documentation, is 45 minutes.      Discussed in details with patient at bedside. All questions were answered to their full satisfaction. Risk, benefit and alternatives were discussed. More than 50% time spent in counseling and coordination of care.             Past Medical History:   Diagnosis Date    Hyperlipidemia                  Current Outpatient Prescriptions   Medication Sig Dispense Refill    CALCIUM CARBONATE (CALCARB 600 PO) Take  by mouth.        ascorbic acid, vitamin C, (VITAMIN C) 500 mg tablet Take 1,000 mg by mouth.        fish oil-dha-epa 1,200-144-216 mg cap Take  by mouth.        multivitamin (ONE A DAY) tablet Take 1 Tab by mouth daily.        amiodarone (CORDARONE) 200 mg tablet Take 1 Tab by mouth daily. 30 Tab 6    apixaban (ELIQUIS) 5 mg tablet Take 1 Tab by mouth two (2) times a day. 60 Tab 6    atorvastatin (LIPITOR) 20 mg tablet Take  by mouth daily.             Social History   reports that he quit smoking about 4 years ago. His smoking use included Cigarettes. He has never used smokeless tobacco.   reports that he drinks alcohol.     Family History  family history includes Stroke in his father.     Review of Systems  Except as stated above include:  Constitutional: Negative for fever, chills and malaise/fatigue. HEENT: No congestion or recent URI. Gastrointestinal: No nausea, vomiting, abdominal pain, bloody stools. Pulmonary:  Negative except as stated above. Cardiac:  Negative except as stated above. Musculoskeletal: Negative except as stated above. Neurological:  No localized symptoms. Skin:  Negative except as stated above. Psych:  No mood swings.   Endocrine:  No heat/cold intolerance.     PHYSICAL EXAM      BP Readings from Last 3 Encounters:   03/01/18 136/76   02/01/18 126/83   01/12/18 113/65      Pulse Readings from Last 3 Encounters:   03/01/18 83   02/01/18 70   01/12/18 78          Wt Readings from Last 3 Encounters:   02/01/18 123.8 kg (273 lb)   01/12/18 118.8 kg (262 lb)   12/08/17 121.1 kg (267 lb)      General:                    Well developed, well groomed. Head/Neck:               No jugular venous distention                                               No carotid bruits. No evidence of xanthelasma. Lungs:                       No respiratory distress. Clear bilaterally. Heart:                                              Irreg irreg. Normal S1/S2. Palpation of heart with normal point of maximum impulse. No significant murmurs, rubs or gallops. Abdomen:                  Soft and nontender. No palpable abdominal mass or bruits. Extremities:               Intact peripheral pulses. No significant edema. Neurological:             Alert and oriented to person, place, time.                                                 No focal neurological deficit visually.     Blood Pressure Metric:  Elfego Zavala has been given the following recommendations today due to his elevated BP reading: lifestyle modifications to include: dietary sodium restriction.         Electronically signed by Edi Al MD at 03/01/18 [id number]

## 2018-04-03 ENCOUNTER — HOSPITAL ENCOUNTER (OUTPATIENT)
Dept: NON INVASIVE DIAGNOSTICS | Age: 58
Setting detail: OBSERVATION
Discharge: HOME | End: 2018-04-04
Attending: INTERNAL MEDICINE | Admitting: INTERNAL MEDICINE
Payer: COMMERCIAL

## 2018-04-03 ENCOUNTER — ANESTHESIA (OUTPATIENT)
Dept: NON INVASIVE DIAGNOSTICS | Age: 58
End: 2018-04-03
Payer: COMMERCIAL

## 2018-04-03 ENCOUNTER — ANESTHESIA EVENT (OUTPATIENT)
Dept: NON INVASIVE DIAGNOSTICS | Age: 58
End: 2018-04-03
Payer: COMMERCIAL

## 2018-04-03 DIAGNOSIS — Z98.890 S/P ABLATION OF ATRIAL FIBRILLATION: Primary | ICD-10-CM

## 2018-04-03 DIAGNOSIS — I48.0 PAF (PAROXYSMAL ATRIAL FIBRILLATION) (HCC): ICD-10-CM

## 2018-04-03 DIAGNOSIS — Z86.79 S/P ABLATION OF ATRIAL FIBRILLATION: Primary | ICD-10-CM

## 2018-04-03 LAB
ACT BLD: 301 SECS (ref 79–138)
ACT BLD: 345 SECS (ref 79–138)

## 2018-04-03 PROCEDURE — 74011250636 HC RX REV CODE- 250/636

## 2018-04-03 PROCEDURE — C1769 GUIDE WIRE: HCPCS

## 2018-04-03 PROCEDURE — C1894 INTRO/SHEATH, NON-LASER: HCPCS

## 2018-04-03 PROCEDURE — 77030005401 HC CATH RAD ARRO -A: Performed by: ANESTHESIOLOGY

## 2018-04-03 PROCEDURE — 99218 HC RM OBSERVATION: CPT

## 2018-04-03 PROCEDURE — 77030030261 HC CBL UMB ELEC DISP MEDT -C

## 2018-04-03 PROCEDURE — C1887 CATHETER, GUIDING: HCPCS

## 2018-04-03 PROCEDURE — C1730 CATH, EP, 19 OR FEW ELECT: HCPCS

## 2018-04-03 PROCEDURE — C1733 CATH, EP, OTHR THAN COOL-TIP: HCPCS

## 2018-04-03 PROCEDURE — 93662 INTRACARDIAC ECG (ICE): CPT

## 2018-04-03 PROCEDURE — 74011000250 HC RX REV CODE- 250: Performed by: INTERNAL MEDICINE

## 2018-04-03 PROCEDURE — 77030013797 HC KT TRNSDUC PRSSR EDWD -A

## 2018-04-03 PROCEDURE — 77030018729 HC ELECTRD DEFIB PAD CARD -B

## 2018-04-03 PROCEDURE — 77030013797 HC KT TRNSDUC PRSSR EDWD -A: Performed by: ANESTHESIOLOGY

## 2018-04-03 PROCEDURE — C1893 INTRO/SHEATH, FIXED,NON-PEEL: HCPCS

## 2018-04-03 PROCEDURE — 74011250637 HC RX REV CODE- 250/637: Performed by: INTERNAL MEDICINE

## 2018-04-03 PROCEDURE — 93312 ECHO TRANSESOPHAGEAL: CPT

## 2018-04-03 PROCEDURE — 74011000250 HC RX REV CODE- 250

## 2018-04-03 PROCEDURE — 77030030278 HC COAX UMB DISP MEDT -C

## 2018-04-03 PROCEDURE — 77030002996 HC SUT SLK J&J -A

## 2018-04-03 PROCEDURE — 77030012468 HC VLV BLEEDBK CNTRL ABBT -B

## 2018-04-03 PROCEDURE — 76060000037 HC ANESTHESIA 3 TO 3.5 HR

## 2018-04-03 PROCEDURE — C1759 CATH, INTRA ECHOCARDIOGRAPHY: HCPCS

## 2018-04-03 PROCEDURE — 77030016570 HC BLNKT BAIR HGGR 3M -B

## 2018-04-03 PROCEDURE — 76937 US GUIDE VASCULAR ACCESS: CPT

## 2018-04-03 PROCEDURE — 77030020506 HC NDL TRNSPTL NRG BAYL -F

## 2018-04-03 PROCEDURE — 77030019896 HC KT ARTERIAL LN TELE -B

## 2018-04-03 PROCEDURE — 85347 COAGULATION TIME ACTIVATED: CPT

## 2018-04-03 PROCEDURE — 74011250636 HC RX REV CODE- 250/636: Performed by: NURSE ANESTHETIST, CERTIFIED REGISTERED

## 2018-04-03 PROCEDURE — 77030011640 HC PAD GRND REM COVD -A

## 2018-04-03 PROCEDURE — 74011250636 HC RX REV CODE- 250/636: Performed by: INTERNAL MEDICINE

## 2018-04-03 PROCEDURE — 93656 COMPRE EP EVAL ABLTJ ATR FIB: CPT

## 2018-04-03 PROCEDURE — 93005 ELECTROCARDIOGRAM TRACING: CPT

## 2018-04-03 PROCEDURE — 77030008683 HC TU ET CUF COVD -A: Performed by: ANESTHESIOLOGY

## 2018-04-03 RX ORDER — FENTANYL CITRATE 50 UG/ML
50 INJECTION, SOLUTION INTRAMUSCULAR; INTRAVENOUS AS NEEDED
Status: DISCONTINUED | OUTPATIENT
Start: 2018-04-03 | End: 2018-04-04 | Stop reason: HOSPADM

## 2018-04-03 RX ORDER — HEPARIN SODIUM 200 [USP'U]/100ML
2000 INJECTION, SOLUTION INTRAVENOUS ONCE
Status: COMPLETED | OUTPATIENT
Start: 2018-04-03 | End: 2018-04-03

## 2018-04-03 RX ORDER — DEXAMETHASONE SODIUM PHOSPHATE 4 MG/ML
INJECTION, SOLUTION INTRA-ARTICULAR; INTRALESIONAL; INTRAMUSCULAR; INTRAVENOUS; SOFT TISSUE AS NEEDED
Status: DISCONTINUED | OUTPATIENT
Start: 2018-04-03 | End: 2018-04-03 | Stop reason: HOSPADM

## 2018-04-03 RX ORDER — HEPARIN SODIUM 1000 [USP'U]/ML
INJECTION, SOLUTION INTRAVENOUS; SUBCUTANEOUS AS NEEDED
Status: DISCONTINUED | OUTPATIENT
Start: 2018-04-03 | End: 2018-04-03 | Stop reason: HOSPADM

## 2018-04-03 RX ORDER — SODIUM CHLORIDE 9 MG/ML
INJECTION, SOLUTION INTRAVENOUS
Status: DISCONTINUED | OUTPATIENT
Start: 2018-04-03 | End: 2018-04-03 | Stop reason: HOSPADM

## 2018-04-03 RX ORDER — SUCCINYLCHOLINE CHLORIDE 20 MG/ML
INJECTION INTRAMUSCULAR; INTRAVENOUS AS NEEDED
Status: DISCONTINUED | OUTPATIENT
Start: 2018-04-03 | End: 2018-04-03 | Stop reason: HOSPADM

## 2018-04-03 RX ORDER — LIDOCAINE HYDROCHLORIDE 10 MG/ML
1-90 INJECTION, SOLUTION EPIDURAL; INFILTRATION; INTRACAUDAL; PERINEURAL
Status: DISCONTINUED | OUTPATIENT
Start: 2018-04-03 | End: 2018-04-03 | Stop reason: HOSPADM

## 2018-04-03 RX ORDER — ONDANSETRON 2 MG/ML
4 INJECTION INTRAMUSCULAR; INTRAVENOUS ONCE
Status: DISPENSED | OUTPATIENT
Start: 2018-04-03 | End: 2018-04-04

## 2018-04-03 RX ORDER — AMIODARONE HYDROCHLORIDE 200 MG/1
200 TABLET ORAL 2 TIMES DAILY
Status: DISCONTINUED | OUTPATIENT
Start: 2018-04-03 | End: 2018-04-04 | Stop reason: HOSPADM

## 2018-04-03 RX ORDER — OXYCODONE AND ACETAMINOPHEN 5; 325 MG/1; MG/1
1 TABLET ORAL
Status: DISCONTINUED | OUTPATIENT
Start: 2018-04-03 | End: 2018-04-04 | Stop reason: HOSPADM

## 2018-04-03 RX ORDER — ONDANSETRON 2 MG/ML
INJECTION INTRAMUSCULAR; INTRAVENOUS AS NEEDED
Status: DISCONTINUED | OUTPATIENT
Start: 2018-04-03 | End: 2018-04-03 | Stop reason: HOSPADM

## 2018-04-03 RX ORDER — PROTAMINE SULFATE 10 MG/ML
INJECTION, SOLUTION INTRAVENOUS AS NEEDED
Status: DISCONTINUED | OUTPATIENT
Start: 2018-04-03 | End: 2018-04-03 | Stop reason: HOSPADM

## 2018-04-03 RX ORDER — MIDAZOLAM HYDROCHLORIDE 1 MG/ML
INJECTION, SOLUTION INTRAMUSCULAR; INTRAVENOUS AS NEEDED
Status: DISCONTINUED | OUTPATIENT
Start: 2018-04-03 | End: 2018-04-03 | Stop reason: HOSPADM

## 2018-04-03 RX ORDER — CALCIUM CARBONATE 600 MG
600 TABLET ORAL DAILY
Status: DISCONTINUED | OUTPATIENT
Start: 2018-04-04 | End: 2018-04-04 | Stop reason: HOSPADM

## 2018-04-03 RX ORDER — EPHEDRINE SULFATE/0.9% NACL/PF 25 MG/5 ML
SYRINGE (ML) INTRAVENOUS AS NEEDED
Status: DISCONTINUED | OUTPATIENT
Start: 2018-04-03 | End: 2018-04-03 | Stop reason: HOSPADM

## 2018-04-03 RX ORDER — ATORVASTATIN CALCIUM 20 MG/1
20 TABLET, FILM COATED ORAL DAILY
Status: DISCONTINUED | OUTPATIENT
Start: 2018-04-04 | End: 2018-04-04 | Stop reason: HOSPADM

## 2018-04-03 RX ORDER — SODIUM CHLORIDE 0.9 % (FLUSH) 0.9 %
5-10 SYRINGE (ML) INJECTION EVERY 8 HOURS
Status: DISCONTINUED | OUTPATIENT
Start: 2018-04-03 | End: 2018-04-04 | Stop reason: HOSPADM

## 2018-04-03 RX ORDER — ASCORBIC ACID 250 MG
1000 TABLET ORAL DAILY
Status: DISCONTINUED | OUTPATIENT
Start: 2018-04-04 | End: 2018-04-04 | Stop reason: HOSPADM

## 2018-04-03 RX ORDER — SODIUM CHLORIDE 0.9 % (FLUSH) 0.9 %
5-10 SYRINGE (ML) INJECTION AS NEEDED
Status: DISCONTINUED | OUTPATIENT
Start: 2018-04-03 | End: 2018-04-04 | Stop reason: HOSPADM

## 2018-04-03 RX ORDER — PROPOFOL 10 MG/ML
INJECTION, EMULSION INTRAVENOUS AS NEEDED
Status: DISCONTINUED | OUTPATIENT
Start: 2018-04-03 | End: 2018-04-03 | Stop reason: HOSPADM

## 2018-04-03 RX ORDER — PHENYLEPHRINE HCL IN 0.9% NACL 1 MG/10 ML
SYRINGE (ML) INTRAVENOUS AS NEEDED
Status: DISCONTINUED | OUTPATIENT
Start: 2018-04-03 | End: 2018-04-03 | Stop reason: HOSPADM

## 2018-04-03 RX ORDER — FENTANYL CITRATE 50 UG/ML
INJECTION, SOLUTION INTRAMUSCULAR; INTRAVENOUS AS NEEDED
Status: DISCONTINUED | OUTPATIENT
Start: 2018-04-03 | End: 2018-04-03 | Stop reason: HOSPADM

## 2018-04-03 RX ORDER — THERA TABS 400 MCG
1 TAB ORAL DAILY
Status: DISCONTINUED | OUTPATIENT
Start: 2018-04-04 | End: 2018-04-04 | Stop reason: HOSPADM

## 2018-04-03 RX ORDER — HEPARIN SODIUM 1000 [USP'U]/ML
1000-10000 INJECTION, SOLUTION INTRAVENOUS; SUBCUTANEOUS
Status: DISCONTINUED | OUTPATIENT
Start: 2018-04-03 | End: 2018-04-03 | Stop reason: HOSPADM

## 2018-04-03 RX ADMIN — SODIUM CHLORIDE: 9 INJECTION, SOLUTION INTRAVENOUS at 12:18

## 2018-04-03 RX ADMIN — Medication 5 MG: at 12:52

## 2018-04-03 RX ADMIN — MIDAZOLAM HYDROCHLORIDE 2 MG: 1 INJECTION, SOLUTION INTRAMUSCULAR; INTRAVENOUS at 12:20

## 2018-04-03 RX ADMIN — HEPARIN SODIUM 2000 UNITS: 200 INJECTION, SOLUTION INTRAVENOUS at 12:00

## 2018-04-03 RX ADMIN — FENTANYL CITRATE 50 MCG: 50 INJECTION INTRAMUSCULAR; INTRAVENOUS at 15:37

## 2018-04-03 RX ADMIN — PROTAMINE SULFATE 50 MG: 10 INJECTION, SOLUTION INTRAVENOUS at 15:11

## 2018-04-03 RX ADMIN — SUCCINYLCHOLINE CHLORIDE 140 MG: 20 INJECTION INTRAMUSCULAR; INTRAVENOUS at 12:35

## 2018-04-03 RX ADMIN — AMIODARONE HYDROCHLORIDE 200 MG: 200 TABLET ORAL at 17:57

## 2018-04-03 RX ADMIN — PROPOFOL 200 MG: 10 INJECTION, EMULSION INTRAVENOUS at 12:34

## 2018-04-03 RX ADMIN — HEPARIN SODIUM 2000 UNITS: 1000 INJECTION, SOLUTION INTRAVENOUS; SUBCUTANEOUS at 14:29

## 2018-04-03 RX ADMIN — Medication 5 MG: at 12:54

## 2018-04-03 RX ADMIN — ONDANSETRON 4 MG: 2 INJECTION INTRAMUSCULAR; INTRAVENOUS at 12:45

## 2018-04-03 RX ADMIN — HEPARIN SODIUM 20000 UNITS: 1000 INJECTION, SOLUTION INTRAVENOUS; SUBCUTANEOUS at 13:35

## 2018-04-03 RX ADMIN — Medication 10 ML: at 21:58

## 2018-04-03 RX ADMIN — DEXAMETHASONE SODIUM PHOSPHATE 8 MG: 4 INJECTION, SOLUTION INTRA-ARTICULAR; INTRALESIONAL; INTRAMUSCULAR; INTRAVENOUS; SOFT TISSUE at 12:45

## 2018-04-03 RX ADMIN — Medication 100 MCG: at 12:54

## 2018-04-03 RX ADMIN — LIDOCAINE HYDROCHLORIDE 30 ML: 10 INJECTION, SOLUTION EPIDURAL; INFILTRATION; INTRACAUDAL; PERINEURAL at 13:15

## 2018-04-03 RX ADMIN — OXYCODONE HYDROCHLORIDE AND ACETAMINOPHEN 1 TABLET: 5; 325 TABLET ORAL at 21:59

## 2018-04-03 RX ADMIN — Medication 100 MCG: at 12:52

## 2018-04-03 RX ADMIN — FENTANYL CITRATE 100 MCG: 50 INJECTION, SOLUTION INTRAMUSCULAR; INTRAVENOUS at 12:34

## 2018-04-03 RX ADMIN — Medication 10 ML: at 16:49

## 2018-04-03 RX ADMIN — APIXABAN 5 MG: 5 TABLET, FILM COATED ORAL at 17:57

## 2018-04-03 NOTE — IP AVS SNAPSHOT
[address] [place] [id number] 
[phone] Patient: Franco Hurley MRN: [id number] PRS:1/2/4603 A check paul indicates which time of day the medication should be taken. My Medications START taking these medications Instructions Each Dose to Equal  
 Morning Noon Evening Bedtime  
 oxyCODONE-acetaminophen 5-325 mg per tablet Commonly known as:  PERCOCET Your last dose was: Your next dose is: Take 1 Tab by mouth every four (4) hours as needed. Max Daily Amount: 6 Tabs. 1 Tab CHANGE how you take these medications Instructions Each Dose to Equal  
 Morning Noon Evening Bedtime  
 amiodarone 200 mg tablet Commonly known as:  CORDARONE What changed:   
- when to take this 
- additional instructions Your last dose was: Your next dose is: Take 1 Tab by mouth two (2) times a day. For 2 weeks, then resume 1 tablet daily. 200 mg CONTINUE taking these medications Instructions Each Dose to Equal  
 Morning Noon Evening Bedtime  
 apixaban 5 mg tablet Commonly known as:  Dahiana Estes Your last dose was: Your next dose is: Take 1 Tab by mouth two (2) times a day. 5 mg  
    
   
   
   
  
 atorvastatin 20 mg tablet Commonly known as:  LIPITOR Your last dose was: Your next dose is: Take  by mouth daily. CALCARB 600 PO Your last dose was: Your next dose is: Take  by mouth. fish oil-dha-epa 1,200-144-216 mg Cap Your last dose was: Your next dose is: Take  by mouth.  
     
   
   
   
  
 multivitamin tablet Commonly known as:  ONE A DAY Your last dose was: Your next dose is: Take 1 Tab by mouth daily. 1 Tab VITAMIN C 500 mg tablet Generic drug:  ascorbic acid (vitamin C) Your last dose was: Your next dose is: Take 1,000 mg by mouth. 1000 mg Where to Get Your Medications Information on where to get these meds will be given to you by the nurse or doctor. ! Ask your nurse or doctor about these medications  
  amiodarone 200 mg tablet  
 oxyCODONE-acetaminophen 5-325 mg per tablet

## 2018-04-03 NOTE — IP AVS SNAPSHOT
[address] [address] 
[phone] Patient: Michell Mendez MRN: [id number] IGC:[id number] About your hospitalization You were admitted on:  April 3, 2018 You last received care in the:  [facility] 2 CV STEPDOWN You were discharged on:  April 4, 2018 Why you were hospitalized Your primary diagnosis was:  S/P Ablation Of Atrial Fibrillation Follow-up Information Follow up With Details Comments Contact Info TERRANCE Valentino   [address] 
[phone] Becca Wilkins MD On 6/5/2018 @10:45 500 [facility] [address] 
[phone] TERRANCE Valentino On 4/10/2018 @ 2 o,clock [address] 
[phone] Discharge Orders None A check paul indicates which time of day the medication should be taken. My Medications START taking these medications Instructions Each Dose to Equal  
 Morning Noon Evening Bedtime  
 oxyCODONE-acetaminophen 5-325 mg per tablet Commonly known as:  PERCOCET Your last dose was: Your next dose is: Take 1 Tab by mouth every four (4) hours as needed. Max Daily Amount: 6 Tabs. 1 Tab CHANGE how you take these medications Instructions Each Dose to Equal  
 Morning Noon Evening Bedtime  
 amiodarone 200 mg tablet Commonly known as:  CORDARONE What changed:   
- when to take this 
- additional instructions Your last dose was: Your next dose is: Take 1 Tab by mouth two (2) times a day. For 2 weeks, then resume 1 tablet daily. 200 mg CONTINUE taking these medications Instructions Each Dose to Equal  
 Morning Noon Evening Bedtime  
 apixaban 5 mg tablet Commonly known as:  Ac Elias Your last dose was: Your next dose is: Take 1 Tab by mouth two (2) times a day. 5 mg  
    
   
   
   
  
 atorvastatin 20 mg tablet Commonly known as:  LIPITOR Your last dose was: Your next dose is: Take  by mouth daily. CALCARB 600 PO Your last dose was: Your next dose is: Take  by mouth. fish oil-dha-epa 1,200-144-216 mg Cap Your last dose was: Your next dose is: Take  by mouth.  
     
   
   
   
  
 multivitamin tablet Commonly known as:  ONE A DAY Your last dose was: Your next dose is: Take 1 Tab by mouth daily. 1 Tab VITAMIN C 500 mg tablet Generic drug:  ascorbic acid (vitamin C) Your last dose was: Your next dose is: Take 1,000 mg by mouth. 1000 mg Where to Get Your Medications Information on where to get these meds will be given to you by the nurse or doctor. ! Ask your nurse or doctor about these medications  
  amiodarone 200 mg tablet  
 oxyCODONE-acetaminophen 5-325 mg per tablet Opioid Education Prescription Opioids: What You Need to Know: 
 
 
 
F-face looks uneven A-arms unable to move or move unevenly S-speech slurred or non-existent T-time-call 911 as soon as signs and symptoms begin-DO NOT go Back to bed or wait to see if you get better-TIME IS BRAIN. Warning Signs of HEART ATTACK Call 911 if you have these symptoms: 
? Chest discomfort. Most heart attacks involve discomfort in the center of the chest that lasts more than a few minutes, or that goes away and comes back. It can feel like uncomfortable pressure, squeezing, fullness, or pain. ? Discomfort in other areas of the upper body. Symptoms can include pain or discomfort in one or both arms, the back, neck, jaw, or stomach. ? Shortness of breath with or without chest discomfort. ? Other signs may include breaking out in a cold sweat, nausea, or lightheadedness. Don't wait more than five minutes to call [address]! Fast action can save your life. Calling 911 is almost always the fastest way to get lifesaving treatment. Emergency Medical Services staff can begin treatment when they arrive  up to an hour sooner than if someone gets to the hospital by car. The discharge information has been reviewed with the patient Jess Lacey The patient verbalized understanding. Discharge medications reviewed with the patient and appropriate educational materials and side effects teaching were provided. ___________________________________________________________________________________________________________________________________Disposition: When discharged to home will need follow-up in the office with Dr. Bebe Hinojosa, in 4 weeks. Please call my office at [phone] if any questions or concerns. Restrictions: 
No driving for at least 24 hours after surgery. No heavy lifting > 10 lbs or prolonged standing, walking, or running x 1 week. OK to shower today over incision and remove dressing. Monitor groin for any signs of bleeding and please contact office at [phone] if any issues.   There may be some mild bruising which is not unusual. 
 If any new symptoms develop, such as chest pain, dyspnea, dizziness, please contact office at [phone] immediately. Patient armband removed and shredded ABLATION DISCHARGE INSTRUCTIONS It is normal to feel tired the first couple days. Take it easy and follow the physicians instructions. CHECK THE CATHETER INSERTION SITE DAILY: 
You may shower 24 hours after the procedure, remove the bandage during showering. Wash with soap and water and pat dry. Gentle cleaning of the site with soap and water is sufficient, cover with a dry clean dressing or bandage. Do not apply creams or powders to the area. Do not sit in a bathtub or pool of water for 7 days or until wound has completely healed. Temporary bruising and discomfort is normal and may last a few weeks. You may have a  formation of a small lump at the site which may last up to 6 weeks. CALL THE PHYSICIAN: If the site becomes red, swollen or feels warm to the touch If there is bleeding or drainage or if there is unusual pain at the groin or down the leg. If there is any bleeding, lie down, apply pressure or have someone apply pressure with a clean cloth until the bleeding stops. If the bleeding continues, call 911 to be transported to the hospital. 
DO  [facility]. Activity: For the first 24-48 hours or as instructed by the physician: No lifting, pushing or pulling over 10 pounds and no straining the insertion site. Do not life grocery bags or the garbage can, do not run the vacuum  or  for 7 days. Start with short walks as in the hospital and gradually increase as tolerated each day. It is recommended to walk 30 minutes 5-7 days per week. Follow your physicians instructions on activity. Avoid walking outside in extremes of heat or cold. Walk inside when it is cold and windy or hot and humid. Things to keep in mind: No driving for at least 24 hours, or as designated by your physician. Limit the number of times you go up and down the stairs Take rests and pace yourself with activity. Be careful and do not strain with bowel movements. Medications: Take all medications as prescribed Call your physician if you have any questions Keep an updated list of your medications with you at all times and give a list to your physician and pharmacist 
 
Signs and Symptoms: 
Be cautious of symptoms of angina or recurrent symptoms such as chest discomfort, unusual shortness of breath or fatigue, palpitations. After Care: Follow up with your physician as instructed. Follow a heart healthy diet with proper portion control, daily stress management, daily exercise, blood pressure and cholesterol control , and smoking cessation. [employer] Announcement We are excited to announce that we are making your provider's discharge notes available to you in Comixology. You will see these notes when they are completed and signed by the physician that discharged you from your recent hospital stay. If you have any questions or concerns about any information you see in Comixology, please call the Health Information Department where you were seen or reach out to your Primary Care Provider for more information about your plan of care. Introducing \[facility] [place] [facility]\"" & HEALTH SERVICES! Anjali Lilly introduces [employer] patient portal. Now you can access parts of your medical record, email your doctor's office, and request medication refills online. 1. In your internet browser, go to [email or web address] 2. Click on the First Time User? Click Here link in the Sign In box. You will see the New Member Sign Up page. 3. Enter your Comixology Access Code exactly as it appears below. You will not need to use this code after youve completed the sign-up process. If you do not sign up before the expiration date, you must request a new code. · T3 Search Access Code: [email or web address] Expires: 5/30/2018  1:22 PM 
 
4. Enter the last four digits of your Social Security Number (xxxx) and Date of Birth (mm/dd/yyyy) as indicated and click Submit. You will be taken to the next sign-up page. 5. Create a T3 Search ID. This will be your T3 Search login ID and cannot be changed, so think of one that is secure and easy to remember. 6. Create a T3 Search password. You can change your password at any time. 7. Enter your Password Reset Question and Answer. This can be used at a later time if you forget your password. 8. Enter your e-mail address. You will receive e-mail notification when new information is available in [address] 9. Click Sign Up. You can now view and download portions of your medical record. 10. Click the Download Summary menu link to download a portable copy of your medical information. If you have questions, please visit the Frequently Asked Questions section of the T3 Search website. Remember, T3 Search is NOT to be used for urgent needs. For medical emergencies, dial 911. Now available from your iPhone and Android! Introducing Conrado García As a [facility] patient, I wanted to make you aware of our electronic visit tool called Conrado Zaragozaericafaraz. [facility] 24/7 allows you to connect within minutes with a medical provider 24 hours a day, seven days a week via a mobile device or tablet or logging into a secure website from your computer. You can access Conrado García from anywhere in the [place]. A virtual visit might be right for you when you have a simple condition and feel like you just dont want to get out of bed, or cant get away from work for an appointment, when your regular [facility] provider is not available (evenings, weekends or holidays), or when youre out of town and need minor care.   Electronic visits cost only $49 and if the [facility] 24/7 provider determines a prescription is needed to treat your condition, one can be electronically transmitted to a nearby pharmacy*. Please take a moment to enroll today if you have not already done so. The enrollment process is free and takes just a few minutes. To enroll, please download the [employer] 24/7 barry to your tablet or phone, or visit [email or web address] to enroll on your computer. And, as an [address] [place] [address] patient with a [employer] account, the results of your visits will be scanned into your electronic medical record and your primary care provider will be able to view the scanned results. We urge you to continue to see your regular [employer] provider for your ongoing medical care. And while your primary care provider may not be the one available when you seek a kalidea virtual visit, the peace of mind you get from getting a real diagnosis real time can be priceless. For more information on kalidea, view our Frequently Asked Questions (FAQs) at [email or web address]. Sincerely, 
 
Precious Woods MD 
Chief Medical Officer [facility] Christina Dos Santos *:  certain medications cannot be prescribed via kalidea Unresulted Labs-Please follow up with your PCP about these lab tests Order Current Status EKG, 12 LEAD, SUBSEQUENT Preliminary result Providers Seen During Your Hospitalization Provider Specialty Primary office phone Christen Keith MD Cardiology [phone] Your Primary Care Physician (PCP) Primary Care Physician Office Phone Office Fax Chucho Rosa [phone] You are allergic to the following No active allergies Recent Documentation Height Weight BMI Smoking Status 1.854 m 125 kg 36.36 kg/m2 Former Smoker Emergency Contacts Name Discharge Info Relation Home Work Mobile [facility] CAREGIVER [3] Brother [24] [phone] [facility] HOSPITAL DISCHARGE CAREGIVER [3] Sister [23]   [phone] Marian  Other Relative [6] [phone] Patient Belongings The following personal items are in your possession at time of discharge: 
  Dental Appliances: None  Visual Aid: Glasses, With patient      Home Medications: Kept at bedside   Jewelry: None  Clothing: Pants    Other Valuables: None Discharge Instructions Attachments/References MEFS - OXYCODONE/ACETAMINOPHEN (PERCOCET, ROXICET) - (BY MOUTH) (ENGLISH) Patient Handouts Oxycodone/Acetaminophen (Percocet, Roxicet) - (By mouth) Why this medicine is used:  
Treats pain. This medicine contains a narcotic pain reliever. Contact a nurse or doctor right away if you have: 
· Extreme weakness, shallow breathing, slow heartbeat · Sweating or cold, clammy skin · Skin blisters, rash, or peeling Common side effects: 
· Constipation · Nausea, vomiting · Tiredness © 2017 [address] Information is for End User's use only and may not be sold, redistributed or otherwise used for commercial purposes. Please provide this summary of care documentation to your next provider. Signatures-by signing, you are acknowledging that this After Visit Summary has been reviewed with you and you have received a copy. Patient Signature:  ____________________________________________________________ Date:  ____________________________________________________________  
  
Ion Arvizu Provider Signature:  ____________________________________________________________ Date:  ____________________________________________________________

## 2018-04-03 NOTE — DISCHARGE INSTRUCTIONS
DISCHARGE SUMMARY from Nurse    PATIENT INSTRUCTIONS:    After general anesthesia or intravenous sedation, for 24 hours or while taking prescription Narcotics:  · Limit your activities  · Do not drive and operate hazardous machinery  · Do not make important personal or business decisions  · Do  not drink alcoholic beverages  · If you have not urinated within 8 hours after discharge, please contact your surgeon on call. Report the following to your surgeon:  · Excessive pain, swelling, redness or odor of or around the surgical area  · Temperature over 100.5  · Nausea and vomiting lasting longer than 4 hours or if unable to take medications  · Any signs of decreased circulation or nerve impairment to extremity: change in color, persistent  numbness, tingling, coldness or increase pain  · Any questions    What to do at Home:  Recommended activity: Activity as tolerated, and as directed by your Doctor    If you experience any of the following symptoms chest pain, shortness of breath or any concerns, please follow up with PCP or call 911 for any emergecies. *  Please give a list of your current medications to your Primary Care Provider. *  Please update this list whenever your medications are discontinued, doses are      changed, or new medications (including over-the-counter products) are added. *  Please carry medication information at all times in case of emergency situations. These are general instructions for a healthy lifestyle:    No smoking/ No tobacco products/ Avoid exposure to second hand smoke  Surgeon General's Warning:  Quitting smoking now greatly reduces serious risk to your health.     Obesity, smoking, and sedentary lifestyle greatly increases your risk for illness    A healthy diet, regular physical exercise & weight monitoring are important for maintaining a healthy lifestyle    You may be retaining fluid if you have a history of heart failure or if you experience any of the following symptoms:  Weight gain of 3 pounds or more overnight or 5 pounds in a week, increased swelling in our hands or feet or shortness of breath while lying flat in bed. Please call your doctor as soon as you notice any of these symptoms; do not wait until your next office visit. Recognize signs and symptoms of STROKE:    F-face looks uneven    A-arms unable to move or move unevenly    S-speech slurred or non-existent    T-time-call 911 as soon as signs and symptoms begin-DO NOT go       Back to bed or wait to see if you get better-TIME IS BRAIN. Warning Signs of HEART ATTACK     Call 911 if you have these symptoms:   Chest discomfort. Most heart attacks involve discomfort in the center of the chest that lasts more than a few minutes, or that goes away and comes back. It can feel like uncomfortable pressure, squeezing, fullness, or pain.  Discomfort in other areas of the upper body. Symptoms can include pain or discomfort in one or both arms, the back, neck, jaw, or stomach.  Shortness of breath with or without chest discomfort.  Other signs may include breaking out in a cold sweat, nausea, or lightheadedness. Don't wait more than five minutes to call 911 - MINUTES MATTER! Fast action can save your life. Calling 911 is almost always the fastest way to get lifesaving treatment. Emergency Medical Services staff can begin treatment when they arrive -- up to an hour sooner than if someone gets to the hospital by car. The discharge information has been reviewed with the patient  . The patient verbalized understanding. Discharge medications reviewed with the patient and appropriate educational materials and side effects teaching were provided. ___________________________________________________________________________________________________________________________________Disposition:  When discharged to home will need follow-up in the office with Dr. Jeff De La Torre, in 4 weeks.   Please call my office at [phone] if any questions or concerns. Restrictions:  No driving for at least 24 hours after surgery. No heavy lifting > 10 lbs or prolonged standing, walking, or running x 1 week. OK to shower today over incision and remove dressing. Monitor groin for any signs of bleeding and please contact office at [phone] if any issues. There may be some mild bruising which is not unusual.  If any new symptoms develop, such as chest pain, dyspnea, dizziness, please contact office at [phone] immediately. Patient armband removed and shredded        ABLATION DISCHARGE INSTRUCTIONS    It is normal to feel tired the first couple days. Take it easy and follow the physicians instructions. CHECK THE CATHETER INSERTION SITE DAILY:  You may shower 24 hours after the procedure, remove the bandage during showering. Wash with soap and water and pat dry. Gentle cleaning of the site with soap and water is sufficient, cover with a dry clean dressing or bandage. Do not apply creams or powders to the area. Do not sit in a bathtub or pool of water for 7 days or until wound has completely healed. Temporary bruising and discomfort is normal and may last a few weeks. You may have a  formation of a small lump at the site which may last up to 6 weeks. CALL THE PHYSICIAN:  If the site becomes red, swollen or feels warm to the touch  If there is bleeding or drainage or if there is unusual pain at the groin or down the leg. If there is any bleeding, lie down, apply pressure or have someone apply pressure with a clean cloth until the bleeding stops. If the bleeding continues, call 911 to be transported to the hospital.  DO NOT DRIVE YOURSELF, [address]. Activity:      For the first 24-48 hours or as instructed by the physician:  No lifting, pushing or pulling over 10 pounds and no straining the insertion site.  Do not life grocery bags or the garbage can, do not run the vacuum  or lawn mower for 7 days. Start with short walks as in the hospital and gradually increase as tolerated each day. It is recommended to walk 30 minutes 5-7 days per week. Follow your physicians instructions on activity. Avoid walking outside in extremes of heat or cold. Walk inside when it is cold and windy or hot and humid. Things to keep in mind:  No driving for at least 24 hours, or as designated by your physician. Limit the number of times you go up and down the stairs  Take rests and pace yourself with activity. Be careful and do not strain with bowel movements. Medications: Take all medications as prescribed  Call your physician if you have any questions  Keep an updated list of your medications with you at all times and give a list to your physician and pharmacist    Signs and Symptoms:  Be cautious of symptoms of angina or recurrent symptoms such as chest discomfort, unusual shortness of breath or fatigue, palpitations. After Care: Follow up with your physician as instructed. Follow a heart healthy diet with proper portion control, daily stress management, daily exercise, blood pressure and cholesterol control , and smoking cessation.

## 2018-04-03 NOTE — ANESTHESIA PROCEDURE NOTES
Arterial Line Placement    Start time: 4/3/2018 12:50 PM  End time: 4/3/2018 12:52 PM  Performed by: Treovr Myers  Authorized by: Alesha Combs     Pre-Procedure  Indications:  Arterial pressure monitoring  Preanesthetic Checklist: patient identified, risks and benefits discussed, anesthesia consent, site marked, patient being monitored, timeout performed and patient being monitored    Timeout Time: 12:49        Procedure:   Prep:  Betadine  Seldinger Technique?: No    Orientation:  Left  Location:  Radial artery  Catheter size:  20 G  Number of attempts:  1  Cont Cardiac Output Sensor: No      Assessment:   Post-procedure:  Sterile dressing applied and line secured  Patient Tolerance:  Patient tolerated the procedure well with no immediate complications

## 2018-04-03 NOTE — PROCEDURES
PROCEDURE   -Atrial fibrillation pulmonary vein isolation ablation using [employer].  -Cardiac electrophysiology study.   -Trans-esophageal echocardiogram prior to ablation without left atrial appendage thrombus, please see separate full report. -Transseptal puncture x 1.  -Intracardiac echocardiography.   -Continuous arterial monitoring with placement of left radial arterial sheath by anesthesia. -Pacing and sensing from the right atrium and left atrium via the coronary sinus.   -Placement of coronary sinus and His catheters. -General endotracheal intubation with anesthesia. -Continuous esophageal temperature monitoring.   -Phrenic nerve pacing during isolation of the right pulmonary veins. DIAGNOSES:  Primary cardiologist Dr. Luis Daniel Ramesh  Paroxysmal symptomatic atrial fibrillation with symptoms of mild shortness of breath and palpitations. History of cardioversion as well as Amiodarone with recurrence. Recent new diagnosis of sleep apnea obtaining a sleep machine. Previous echocardiogram with normal function. Hypertension. Chronic Eliquis use. Dyslipidemia. IVF:  600 cc  IV contrast 40 cc    PROCEDURE: After informed consent was obtained, the patient was brought to electrophysiology lab in a fasting and nonsedated state. The patient was given general anesthesia and intubated. Trans-esophageal echocardiogram was performed. There was no evidence of left atrial appendage thrombus. Please see separate report for details. Both groins were prepped and draped in the usual sterile fashion. In the right femoral vein was inserted a 7 and 8 Western Renetta sheath. In the left femoral vein was inserted a 6 and 9 Ghanaian sheath. An intracardiographic catheter was placed through the 9 Ghanaian sheath into the right atrium with continuous monitoring. A decapolar deflectable catheter was placed in the coronary sinus. A CRD-2 catheter was placed along the HIS.      In the right femoral artery was inserted a 4-Greek sheath with continuous arterial monitoring. Initial rhythm:  Atrial fibrillation with ventricular rate 50-80 bpm  HV interval NA ms. QRS duration 118 ms.  ms. Given underlying atrial fibrillation, an external 200 J synchronized cardioversion was performed to restore sinus rhythm. AA/ ms. A Kirkwood sheath was placed over a wire into the SVC. Under fluoroscopic and intraechocardiographic guidance, a Kirkwood needle was placed in the OULU sheath and the septum punctured. The vessels, including pulmonary veins and septum were visualized, patency and location was confirmed, needle entry was directed directly, and the images were stored in the system. Every attempt was made to obtain a low and anterior puncture to accommodate the balloon. Heparin was given immediately with goal ACT greater than 300 after confirmation of no bleeding or pericardial effusion. ACT was monitored every 10-15 minutes during the case. The long OULU sheath was then exchanged over an Amplatz Superstiff wire for the Medtronic Flexcath sheath (15 Western Renetta external and 12 Greek internal diameter) by placing the guide wire into the left superior pulmonary vein. The sheath was flushed and continuous irrigation at 100 cc/hour (NS) was connected to the Flexcath sheath. Left Superior Vein:  The Medtronic Achieve 8-pole lasso catheter was inserted into the left superior pulmonary vein. The [employer] 28 mm Cryoablation balloon was then placed over the lasso just ostial to the vein in the left atrium. The balloon was then inflated to 2 psi. The balloon was manipulated by carefully adjusting the Flexcath and balloon to the ostium of the vein. Occlusion of the vein was confirmed by injecting contrasting directly into the vein through the balloon catheter. After confirmation, the balloon was cooled to not greater than -55 C for approximately 3 minutes.   Continuous monitoring of the lasso was performed to confirm pulmonary vein isolation. A second freeze was performed for 2 minutes. Left Inferior Vein:  The balloon was then pulled back into the sheath and the balloon was manipulated in the left atrium by carefully adjusting the Flexcath and balloon to the ostium of the left inferior vein. Occlulsion of the vein was confirmed by injecting contrasting directly into the vein through the balloon catheter. After confirmation, the balloon was cooled to not greater than -55 C for approximately 3 minutes. Continuous monitoring of the lasso was performed to confirm pulmonary vein isolation. A second freeze was performed for 2 minutes. Right Inferior Vein:  The balloon was then pulled back into the sheath and the balloon was manipulated in the left atrium by carefully adjusting the Flexcath and balloon to the ostium of the right inferior vein. Occlusion of the vein was confirmed by injecting contrasting directly into the vein through the balloon catheter. The balloon was cooled to not greater than -55 C for approximately 3 minutes. Continuous monitoring of the lasso was performed to confirm pulmonary vein isolation. A second freeze was performed for 2 minutes. Right super vein:  The balloon was then pulled back into the sheath and the balloon was manipulated in the left atrium by carefully adjusting the Flexcath and balloon to the ostium of the right inferior vein. Occlusion of the vein was confirmed by injecting contrasting directly into the vein through the balloon catheter. The balloon was cooled to not greater than -55 C for approximately 3 minutes. Continuous monitoring of the lasso was performed to confirm pulmonary vein isolation. A second freeze was performed for 2 minutes. During manipulation in the right superior vein, typical A.flutter with variable AV block and AA of 245 was induced.   External 50 J, then 100 J was performed with restoration of SR    Continuous monitoring of the phrenic nerve was performed during freezing on both right sided veins by placing the CRD-2 His catheter into the SVC and pacing at 20 mA at 1200 msec. I personally placed my hand on the right diaphragm and also intermittently monitoring with flouroscopy during each freeze. Continuous esophageal temperature monitoring was also performed during each freeze and ablating was immediately discontinued if the temperature dropped below 20 degrees Celsius. Pacing and sensing was performed from right atrium, left atrium via coronary sinus and right ventricle. No evidence of dual AV node physiology. Concentric retrograde activation. The long Flexcath sheath was downsized to a short 9-Nepali sheath. All catheters were removed. The patient was given protamine and awoke without difficulty or focal neurological deficits and left the lab in stable condition after removal of all catheters. IMPRESSION:   -Successful atrial fibrillation ablation pulmonary vein isolation using [employer] catheter  -Resume Eliquis 5 mg PO Q12 hours, 2 hours after sheath removal.  -Plan to start proton pump inhibitor x 1 month  -Monitor overnight and discharge tomorrow.  -Follow-up with primary cardiologist, Dr. Netta Sparrow, in 4 weeks. -Increase amiodarone to 200 mg BID x 2 weeks, then back to 200 mg daily.  -If he has recurrent A.flutter, I would offer A.flutter ablation. Thank you kindly for allowing me to participate in this patient's care. Please do not hesitate to contact me if any questions.

## 2018-04-03 NOTE — ANESTHESIA PREPROCEDURE EVALUATION
Anesthetic History               Review of Systems / Medical History  Patient summary reviewed and pertinent labs reviewed    Pulmonary  Within defined limits                 Neuro/Psych   Within defined limits           Cardiovascular            Dysrhythmias : atrial fibrillation      Exercise tolerance: >4 METS     GI/Hepatic/Renal  Within defined limits              Endo/Other        Obesity     Other Findings   Comments: Documentation of current medication  Current medications obtained, documented and obtained? YES      Risk Factors for Postoperative nausea/vomiting:       History of postoperative nausea/vomiting? NO       Female? NO       Motion sickness? NO       Intended opioid administration for postoperative analgesia? NO      Smoking Abstinence:  Current Smoker? NO  Elective Surgery? YES  Seen preoperatively by anesthesiologist or proxy prior to day of surgery? YES  Pt abstained from smoking 24 hours prior to anesthesia?  N/A    Preventive care/screening for High Blood Pressure:  Aged 18 years and older: YES  Screened for high blood pressure: YES  Patients with high blood pressure referred to primary care provider   for BP management: YES                 Physical Exam    Airway  Mallampati: III  TM Distance: < 4 cm  Neck ROM: normal range of motion   Mouth opening: Normal     Cardiovascular  Regular rate and rhythm,  S1 and S2 normal,  no murmur, click, rub, or gallop             Dental  No notable dental hx       Pulmonary  Breath sounds clear to auscultation               Abdominal  GI exam deferred       Other Findings            Anesthetic Plan    ASA: 3  Anesthesia type: general    Monitoring Plan: Arterial line      Induction: Intravenous  Anesthetic plan and risks discussed with: Patient

## 2018-04-03 NOTE — ANESTHESIA POSTPROCEDURE EVALUATION
Post-Anesthesia Evaluation and Assessment    Patient: Janice Amaral MRN: [id number]  SSN: [id number]    YOB: 1960  Age: 62 y.o. Sex: male      Data from PACU flowsheet    Cardiovascular Function/Vital Signs  Visit Vitals    /86    Pulse 80    Temp 36.3 °C (97.3 °F)    Resp 12    Ht 6' 1\" (1.854 m)    Wt 123.4 kg (272 lb)    SpO2 96%    BMI 35.89 kg/m2       Patient is status post general anesthesia for * No procedures listed *. Nausea/Vomiting: controlled    Postoperative hydration reviewed and adequate. Pain:  Pain Scale 1: Numeric (0 - 10) (04/03/18 1539)  Pain Intensity 1: 10 (04/03/18 1539)   Managed      Mental Status and Level of Consciousness: Alert and oriented     Pulmonary Status:   O2 Device: Room air (04/03/18 1540)   Adequate oxygenation and airway patent    Complications related to anesthesia: None    Post-anesthesia assessment completed.  No concerns    Signed By: Lucie Forrest MD     April 3, 2018

## 2018-04-03 NOTE — PROGRESS NOTES
Cath holding summary    1000: Patient escorted to cath holding from waiting area ambulatory, alert and oriented x 4, voicing no complaints. Changed into gown and placed on monitor. A. Fib noted. NPO since MN. Lab results, med rec and H&P reviewed on chart. PIV x 2 inserted without difficulty. Last took xarelto on Sunday. Family to bedside. 1545: SHEATH PULL NOTE:    Patient informed of procedure and questions answered with review. 7, 14 FR in RFV and 7,9 FR in LFV pulled at Nick Gibson RN and Susie Serna RN. Hand hold and good hemostasis, with manual compression to site. Handhold for 10 minutes. Gauze and transparent dressing applied to site. No bleeding, no hematoma, no pain/discomfort at site. Groin instructions provided with review. Patient verbalized understanding. 1645: Patient resting comfortably at this time. Tolerating PO without difficulty. Groins intact with no bleeding or hematoma noted. 1705: TRANSFER - OUT REPORT:    Verbal report given to [place], [facility](name[facility]  being transferred to CVT Stepdown(unit) for routine post - op       Report consisted of patients Situation, Background, Assessment and   Recommendations(SBAR). Information from the following report(s) SBAR, Procedure Summary, Intake/Output, MAR and Recent Results was reviewed with the receiving nurse. Lines:   Peripheral IV 04/03/18 Left Antecubital (Active)   Site Assessment Clean, dry, & intact 4/3/2018 10:00 AM   Dressing Type Transparent 4/3/2018 10:00 AM   Hub Color/Line Status Pink;Flushed 4/3/2018 10:00 AM       Peripheral IV 04/03/18 Right Forearm (Active)   Site Assessment Clean, dry, & intact 4/3/2018 10:00 AM   Hub Color/Line Status Pink;Flushed 4/3/2018 10:00 AM        Opportunity for questions and clarification was provided.       Patient transported with:   Registered Nurse

## 2018-04-04 VITALS
DIASTOLIC BLOOD PRESSURE: 89 MMHG | SYSTOLIC BLOOD PRESSURE: 155 MMHG | WEIGHT: 275.6 LBS | TEMPERATURE: 97.6 F | HEIGHT: 73 IN | RESPIRATION RATE: 18 BRPM | BODY MASS INDEX: 36.53 KG/M2 | OXYGEN SATURATION: 93 % | HEART RATE: 89 BPM

## 2018-04-04 LAB
ACT BLD: 147 SECS (ref 79–138)
ACT BLD: 301 SECS (ref 79–138)
ATRIAL RATE: 81 BPM
CALCULATED P AXIS, ECG09: 70 DEGREES
CALCULATED R AXIS, ECG10: 29 DEGREES
CALCULATED T AXIS, ECG11: 39 DEGREES
DIAGNOSIS, 93000: NORMAL
P-R INTERVAL, ECG05: 186 MS
Q-T INTERVAL, ECG07: 412 MS
QRS DURATION, ECG06: 124 MS
QTC CALCULATION (BEZET), ECG08: 478 MS
VENTRICULAR RATE, ECG03: 81 BPM

## 2018-04-04 PROCEDURE — 99218 HC RM OBSERVATION: CPT

## 2018-04-04 PROCEDURE — 74011250637 HC RX REV CODE- 250/637: Performed by: INTERNAL MEDICINE

## 2018-04-04 RX ORDER — OXYCODONE AND ACETAMINOPHEN 5; 325 MG/1; MG/1
1 TABLET ORAL
Qty: 15 TAB | Refills: 0 | Status: SHIPPED | OUTPATIENT
Start: 2018-04-04 | End: 2018-08-03

## 2018-04-04 RX ORDER — AMIODARONE HYDROCHLORIDE 200 MG/1
200 TABLET ORAL 2 TIMES DAILY
Qty: 42 TAB | Refills: 0 | Status: SHIPPED | OUTPATIENT
Start: 2018-04-04 | End: 2018-06-14 | Stop reason: SDUPTHER

## 2018-04-04 RX ORDER — AMIODARONE HYDROCHLORIDE 200 MG/1
200 TABLET ORAL 2 TIMES DAILY
Qty: 35 TAB | Refills: 0 | Status: SHIPPED | OUTPATIENT
Start: 2018-04-04 | End: 2018-04-04

## 2018-04-04 RX ADMIN — THERA TABS 1 TABLET: TAB at 08:24

## 2018-04-04 RX ADMIN — APIXABAN 5 MG: 5 TABLET, FILM COATED ORAL at 08:24

## 2018-04-04 RX ADMIN — Medication 1 CAPSULE: at 08:24

## 2018-04-04 RX ADMIN — ATORVASTATIN CALCIUM 20 MG: 20 TABLET, FILM COATED ORAL at 08:24

## 2018-04-04 RX ADMIN — Medication 1000 MG: at 08:23

## 2018-04-04 RX ADMIN — Medication 600 MG: at 08:23

## 2018-04-04 RX ADMIN — AMIODARONE HYDROCHLORIDE 200 MG: 200 TABLET ORAL at 08:24

## 2018-04-04 NOTE — PROGRESS NOTES
LISA NOTE: Pt is being discharge today, home. LISA verified information and obtained signature on the OBS paperwork. Org in chart and copy to pt. Care Management Interventions  PCP Verified by CM:  Yes Regina Raymond, [id number] )  Last Visit to PCP: 11/07/17  Mode of Transport at Discharge: [facility] Time of Discharge: 0930  Physical Therapy Consult: No  Occupational Therapy Consult: No  Speech Therapy Consult: No  Current Support Network: Lives Alone  Confirm Follow Up Transport: Family (sister)  Plan discussed with Pt/Family/Caregiver: Yes  Discharge Location  Discharge Placement: [address] [place] [address]  [phone] (pager)

## 2018-04-04 NOTE — ROUTINE PROCESS
Bedside shift change report given to JoseWarrington (oncoming nurse) by Zeel (offgoing nurse).  Report included the following information: SBAR, labs, plan of care

## 2018-04-04 NOTE — DISCHARGE SUMMARY
[facility]  -  Progress Note      Discharge Summary     Patient: Bailee Schmidt MRN: [id number]  SSN: [id number]    YOB: 1960  Age: 62 y.o. Sex: male       Admit Date: 4/3/2018    Discharge Date: 4/4/2018      Admission Diagnoses: Unspecified atrial fibrillation [I48.91]    Discharge Diagnoses:   Problem List as of 4/4/2018  Date Reviewed: 3/1/2018          Codes Class Noted - Resolved    * (Principal)S/P ablation of atrial fibrillation ICD-10-CM: Z98.890, Z86.79  ICD-9-CM: V45.89  4/3/2018 - Present        History of cardioversion ICD-10-CM: Z98.890  ICD-9-CM: V15.1  2/1/2018 - Present    Overview Signed 2/1/2018 11:23 AM by Latoya Chester MD     1/2018             High risk medication use ICD-10-CM: Z79.899  ICD-9-CM: V58.69  2/1/2018 - Present    Overview Signed 2/1/2018 11:23 AM by Latoya Chester MD     on amiodarone             Hyperlipidemia ICD-10-CM: E78.5  ICD-9-CM: 272.4  11/8/2016 - Present    Overview Signed 11/8/2016 10:07 AM by Latoya Chester MD     on lipitor'  lab with pcp             SOB (shortness of breath) ICD-10-CM: R06.02  ICD-9-CM: 786.05  4/1/2016 - Present    Overview Signed 4/1/2016 12:16 PM by Latoya Chester MD     exertional  rare fairly activity             Edema ICD-10-CM: R60.9  ICD-9-CM: 782.3  4/1/2016 - Present    Overview Signed 4/1/2016 12:16 PM by Latoya Chester MD     started 3 months ago             PAF (paroxysmal atrial fibrillation) ([facility] [address] ICD-10-CM: I48.0  ICD-9-CM: 427.31  4/1/2016 - Present    Overview Signed 4/1/2016 12:16 PM by Latoya Chester MD     recent onset  back in sr  discused caffein intake             Incomplete RBBB ICD-10-CM: I45.10  ICD-9-CM: 426.4  4/1/2016 - Present               Discharge Condition: stable    Hospital Course: Patient presented for elective atrial fibrillation ablation. Pt had successful ablation with no acute overnight complications. He remains in NSR this morning on the monitor.  Patient will be discharged this morning and is directed to follow up with his primary cardiologist Dr No Maciel. Patient will be continued on Eliquis with further directions from Dr Arvin Denver. Wounds examined and are without hematoma, bleeding, or signs of infection. DIAGNOSES:  Primary cardiologist Dr. Emeli Elliott  Paroxysmal symptomatic atrial fibrillation with symptoms of mild shortness of breath and palpitations. History of cardioversion as well as Amiodarone with recurrence. Recent new diagnosis of sleep apnea obtaining a sleep machine. Previous echocardiogram with normal function. Hypertension. Chronic Eliquis use. Dyslipidemia. Consults: none    Significant Diagnostic Studies:   PROCEDURE   -Atrial fibrillation pulmonary vein isolation ablation using [employer].  -Cardiac electrophysiology study.   -Trans-esophageal echocardiogram prior to ablation without left atrial appendage thrombus, please see separate full report. -Transseptal puncture x 1.  -Intracardiac echocardiography.   -Continuous arterial monitoring with placement of left radial arterial sheath by anesthesia. -Pacing and sensing from the right atrium and left atrium via the coronary sinus.   -Placement of coronary sinus and His catheters. -General endotracheal intubation with anesthesia. -Continuous esophageal temperature monitoring.   -Phrenic nerve pacing during isolation of the right pulmonary veins. Disposition: home    Discharge Medications:   Current Discharge Medication List      START taking these medications    Details   oxyCODONE-acetaminophen (PERCOCET) 5-325 mg per tablet Take 1 Tab by mouth every four (4) hours as needed. Max Daily Amount: 6 Tabs. Qty: 15 Tab, Refills: 0    Associated Diagnoses: S/P ablation of atrial fibrillation         CONTINUE these medications which have NOT CHANGED    Details   CALCIUM CARBONATE (CALCARB 600 PO) Take  by mouth.       ascorbic acid, vitamin C, (VITAMIN C) 500 mg tablet Take 1,000 mg by mouth. fish oil-dha-epa 1,200-144-216 mg cap Take  by mouth.      multivitamin (ONE A DAY) tablet Take 1 Tab by mouth daily. amiodarone (CORDARONE) 200 mg tablet Take 1 Tab by mouth twice daily for 2 weeks, then resume 1 tablet daily. atorvastatin (LIPITOR) 20 mg tablet Take  by mouth daily. apixaban (ELIQUIS) 5 mg tablet Take 1 Tab by mouth two (2) times a day. Qty: 60 Tab, Refills: 6             Activity: as directed  Diet: cardiac  Wound Care: as directed    Follow-up Appointments   Procedures    FOLLOW UP VISIT Appointment in: One Month Follow up with Dr Shandra Muniz in one month. Follow up with Dr Shandra Muniz in one month.      Standing Status:   Standing     Number of Occurrences:   1     Order Specific Question:   Appointment in     Answer:   One Month       Signed By: Clifford Villela     April 4, 2018

## 2018-06-01 LAB — LDL-C, EXTERNAL: 96

## 2018-06-05 ENCOUNTER — TELEPHONE (OUTPATIENT)
Dept: CARDIOLOGY CLINIC | Age: 58
End: 2018-06-05

## 2018-06-05 ENCOUNTER — OFFICE VISIT (OUTPATIENT)
Dept: CARDIOLOGY CLINIC | Age: 58
End: 2018-06-05

## 2018-06-05 VITALS
HEIGHT: 73 IN | DIASTOLIC BLOOD PRESSURE: 80 MMHG | WEIGHT: 279 LBS | HEART RATE: 71 BPM | SYSTOLIC BLOOD PRESSURE: 136 MMHG | BODY MASS INDEX: 36.98 KG/M2

## 2018-06-05 DIAGNOSIS — Z98.890 S/P ABLATION OF ATRIAL FIBRILLATION: ICD-10-CM

## 2018-06-05 DIAGNOSIS — E78.5 HYPERLIPIDEMIA, UNSPECIFIED HYPERLIPIDEMIA TYPE: ICD-10-CM

## 2018-06-05 DIAGNOSIS — I48.0 PAF (PAROXYSMAL ATRIAL FIBRILLATION) (HCC): Primary | ICD-10-CM

## 2018-06-05 DIAGNOSIS — G47.33 OSA ON CPAP: ICD-10-CM

## 2018-06-05 DIAGNOSIS — Z79.899 HIGH RISK MEDICATION USE: ICD-10-CM

## 2018-06-05 DIAGNOSIS — Z86.79 S/P ABLATION OF ATRIAL FIBRILLATION: ICD-10-CM

## 2018-06-05 DIAGNOSIS — I45.10 INCOMPLETE RBBB: ICD-10-CM

## 2018-06-05 DIAGNOSIS — Z99.89 OSA ON CPAP: ICD-10-CM

## 2018-06-05 NOTE — LETTER
Soterolamar Coad 1960 6/5/2018 Dear TERRANCE Ruby MD 
 
I had the pleasure of evaluating  Mr. Hank Waller in office today. Below are the relevant portions of my assessment and plan of care. ICD-10-CM ICD-9-CM 1. PAF (paroxysmal atrial fibrillation) (HCC) I48.0 427.31 T4, FREE  
   TSH 3RD GENERATION  
   HEPATIC FUNCTION PANEL  
   PFT DLCO  
 stable on amiodarone 
eliquis 2. Hyperlipidemia, unspecified hyperlipidemia type E78.5 272.4   
 on statin 
lab with pcp 3. Incomplete RBBB I45.10 426.4   
 old 4. S/P ablation of atrial fibrillation Z98.890 V45.89   
 Z86.79    
 stable 5. High risk medication use Z79.899 V58.69 T4, FREE  
   TSH 3RD GENERATION  
   HEPATIC FUNCTION PANEL  
   PFT DLCO  
 amiodarone for af  
6. ISAÍAS on CPAP G47.33 327.23   
 Z99.89 V46.8   
 stable Current Outpatient Prescriptions Medication Sig Dispense Refill  garlic (GARLIQUE PO) Take  by mouth.  oxyCODONE-acetaminophen (PERCOCET) 5-325 mg per tablet Take 1 Tab by mouth every four (4) hours as needed. Max Daily Amount: 6 Tabs. 15 Tab 0  
 amiodarone (CORDARONE) 200 mg tablet Take 1 Tab by mouth two (2) times a day. For 2 weeks, then resume 1 tablet daily. 42 Tab 0  
 CALCIUM CARBONATE (CALCARB 600 PO) Take  by mouth.  ascorbic acid, vitamin C, (VITAMIN C) 500 mg tablet Take 1,000 mg by mouth.  fish oil-dha-epa 1,200-144-216 mg cap Take  by mouth.  multivitamin (ONE A DAY) tablet Take 1 Tab by mouth daily.  apixaban (ELIQUIS) 5 mg tablet Take 1 Tab by mouth two (2) times a day. 60 Tab 6  
 atorvastatin (LIPITOR) 20 mg tablet Take  by mouth daily. Orders Placed This Encounter  T4, FREE Standing Status:   Future Standing Expiration Date:   6/6/2019  
 TSH 3RD GENERATION Standing Status:   Future Standing Expiration Date:   7/5/2018  
 HEPATIC FUNCTION PANEL Standing Status:   Future Standing Expiration Date:   12/4/2018  PFT DLCO Standing Status:   Future Standing Expiration Date:   12/3/2018  garlic (GARLIQUE PO) Sig: Take  by mouth. If you have questions, please do not hesitate to call me. I look forward to following  Blaise Douglass along with you. Sincerely, Danielle Weiss MD

## 2018-06-05 NOTE — PROGRESS NOTES
HISTORY OF PRESENT ILLNESS  Michell Mendez is a 62 y.o. male. HPI Comments: Patient with paf,hyperlipidemia  On follow up patient denies any chest pains,sob, palpitation or other significant symptoms. One episode of extreme fatigue while in house-resolved with rest-no recurrence    Palpitations    The history is provided by the patient. This is a new problem. The problem has been resolved. The problem is associated with nothing. Pertinent negatives include no fever, no chest pain, no claudication, no orthopnea, no PND, no abdominal pain, no nausea, no vomiting, no headaches, no dizziness, no weakness, no cough, no hemoptysis, no shortness of breath and no sputum production. His past medical history is significant for hypertension. Hypertension   The history is provided by the patient. This is a chronic problem. The problem occurs constantly. The problem has not changed since onset. Pertinent negatives include no chest pain, no abdominal pain, no headaches and no shortness of breath. Cholesterol Problem   The history is provided by the patient. This is a chronic problem. The problem occurs constantly. The problem has not changed since onset. Pertinent negatives include no chest pain, no abdominal pain, no headaches and no shortness of breath. Review of Systems   Constitutional: Negative for chills and fever. HENT: Negative for nosebleeds. Eyes: Negative for blurred vision and double vision. Respiratory: Negative for cough, hemoptysis, sputum production, shortness of breath and wheezing. Cardiovascular: Positive for palpitations. Negative for chest pain, orthopnea, claudication, leg swelling and PND. Gastrointestinal: Negative for abdominal pain, heartburn, nausea and vomiting. Musculoskeletal: Negative for myalgias. Skin: Negative for rash. Neurological: Negative for dizziness, weakness and headaches. Endo/Heme/Allergies: Does not bruise/bleed easily.      Family History   Problem Relation Age of Onset    Stroke Father        Past Medical History:   Diagnosis Date    Hyperlipidemia     ISAÍAS on CPAP 6/5/2018    stable       Past Surgical History:   Procedure Laterality Date    HX HERNIA REPAIR         Social History   Substance Use Topics    Smoking status: Former Smoker     Types: Cigarettes     Quit date: 3/9/2013    Smokeless tobacco: Never Used    Alcohol use 0.0 oz/week     0 Standard drinks or equivalent per week      Comment: OCC        No Known Allergies        Visit Vitals    /80    Pulse 71    Ht 6' 1\" (1.854 m)    Wt 126.6 kg (279 lb)    BMI 36.81 kg/m2         Physical Exam   Constitutional: He is oriented to person, place, and time. He appears well-developed and well-nourished. HENT:   Head: Normocephalic and atraumatic. Eyes: Conjunctivae are normal.   Neck: Neck supple. No JVD present. No tracheal deviation present. No thyromegaly present. Cardiovascular: Normal rate and normal heart sounds. An irregularly irregular rhythm present. Exam reveals no gallop and no friction rub. No murmur heard. Pulmonary/Chest: Breath sounds normal. No respiratory distress. He has no wheezes. He has no rales. He exhibits no tenderness. Abdominal: Soft. There is no tenderness. Musculoskeletal: He exhibits no edema. Neurological: He is alert and oriented to person, place, and time. Skin: Skin is warm and dry. Psychiatric: He has a normal mood and affect. Mr. Ryder Chen has a reminder for a \"due or due soon\" health maintenance. I have asked that he contact his primary care provider for follow-up on this health maintenance. I have personally reviewed patient's records available from hospital and other providers and incorporated findings in patient care. pcp-3/2016  SUMMARY:echo:4/2016  Left ventricle: Systolic function was normal. Ejection fraction was  estimated to be 60 %. No obvious wall motion abnormalities identified in  the views obtained.  There was mild concentric hypertrophy. Doppler  parameters were consistent with abnormal left ventricular relaxation  (grade 1 diastolic dysfunction). Mitral valve: There was trivial regurgitation. NUCLEAR IMAGIN2016     Findings:   1. Stress images reveal normal Myoview distrubution in all the LV segments in short axis, vertical and horizontal long axis views. 2. Resting images have a normal uptake. 3. Gated images reveal normal wall motion and the ejection fraction is calculated to be 85%. Conclusion:   1. Normal perfusion scan. 2. Normal wall motion and ejection fraction. 3. Low risk scan.  2016-event monitor  Sr,st,no afib,? Svt  2018  1. Persistent atrial fibrillation (HCC) [I48.1]   Post-procedure Diagnoses:   1. Atrial fibrillation, currently in sinus rhythm [Z86.79]   Procedures:   1. CARDIOVERSION, ELECTIVE [[id number]]      []Hide copied text  []Hover for attribution information  Sedation provided by anesthesiology  200J x 1 DCCV provided. Patient converted to NSR  No complications         3/9837  Atrial fibrillation   -Incomplete right bundle branch block. ABNORMAL     PROCEDURE -2018  -Atrial fibrillation pulmonary vein isolation ablation using [employer].  -Cardiac electrophysiology study. Assessment         ICD-10-CM ICD-9-CM    1. PAF (paroxysmal atrial fibrillation) (HCC) I48.0 427.31 T4, FREE      TSH 3RD GENERATION      HEPATIC FUNCTION PANEL      PFT DLCO    stable on amiodarone  eliquis   2. Hyperlipidemia, unspecified hyperlipidemia type E78.5 272.4     on statin  lab with pcp   3. Incomplete RBBB I45.10 426.4     old   4. S/P ablation of atrial fibrillation Z98.890 V45.89     Z86.79      stable   5. High risk medication use Z79.899 V58.69 T4, FREE      TSH 3RD GENERATION      HEPATIC FUNCTION PANEL      PFT DLCO    amiodarone for af   6.  ISAÍAS on CPAP G47.33 327.23     Z99.89 V46.8     stable   chads-vasc score low  Will use asa every day    11/2017  Started eliquis for recurrent af-stopped asa  ? Antiarrhythmic in 4 weeks  I have discussed risk benefit and option of use of amiodarone for arrythmia. Risk of toxicity with medication are informed. Patient will require careful monitoring. 12/2017-persistant a fib  Started amiodarone-cardioversion in 4 weeks  1/2018  Cardioverted to sr  2/2018  Recurrent a fib  Awaiting sleep study result  Discussed ablation-  There are no discontinued medications. Orders Placed This Encounter    T4, FREE     Standing Status:   Future     Standing Expiration Date:   6/6/2019    TSH 3RD GENERATION     Standing Status:   Future     Standing Expiration Date:   7/5/2018    HEPATIC FUNCTION PANEL     Standing Status:   Future     Standing Expiration Date:   12/4/2018    PFT DLCO     Standing Status:   Future     Standing Expiration Date:   12/3/2018       Follow-up Disposition:  Return in about 6 months (around 12/5/2018).

## 2018-06-05 NOTE — PROGRESS NOTES
1. Have you been to the ER, urgent care clinic since your last visit? Hospitalized since your last visit? No    2. Have you seen or consulted any other health care providers outside of the [facility] since your last visit? Include any pap smears or colon screening. No    3. Since your last visit, have you had any of the following symptoms? .          4. Have you had any blood work, X-rays or cardiac testing? 5. Where do you normally have your labs drawn? 6. Do you need any refills today?    ***

## 2018-06-05 NOTE — TELEPHONE ENCOUNTER
Patient was in office today and forgot yo ask you if it was alright to take garlique supplement.  Please advise

## 2018-06-05 NOTE — MR AVS SNAPSHOT
[address] [facility] 
 
 
 [address] [facility] 
[phone] Patient: Neva Jain MRN: [id number] IUQ:[birth year] Visit Information Date & Time Provider Department Dept. Phone Encounter #  
 6/5/2018 10:45 AM Daniela Gutierres MD [facility] [place] [id number] [phone] Follow-up Instructions Return in about 6 months (around 12/5/2018). Your Appointments 12/6/2018 10:45 AM  
Office Visit with Daniela Gutierres MD  
[facility] ([address] [facility]) Appt Note: Post PFT/T4/TSH/LFT  
 [address] [facility]. [facility] [address]  
  
   
 [address] [place] [address] Upcoming Health Maintenance Date Due Hepatitis C Screening 1960 DTaP/Tdap/Td series (1 - Tdap) 6/4/1981 FOBT Q 1 YEAR AGE 50-75 6/4/2010 Influenza Age 5 to Adult 8/1/2018 Allergies as of 6/5/2018  Review Complete On: 6/5/2018 By: Daniela Gutierres MD  
 No Known Allergies Current Immunizations  Never Reviewed No immunizations on file. Not reviewed this visit You Were Diagnosed With   
  
 Codes Comments PAF (paroxysmal atrial fibrillation) ([facility]    -  Primary ICD-10-CM: I48.0 ICD-9-CM: 427.31 stable on amiodarone 
eliquis Hyperlipidemia, unspecified hyperlipidemia type     ICD-10-CM: E78.5 ICD-9-CM: 272.4 on statin 
lab with pcp Incomplete RBBB     ICD-10-CM: I45.10 ICD-9-CM: 0.4 old S/P ablation of atrial fibrillation     ICD-10-CM: Z98.890, Z86.79 
ICD-9-CM: V45.89 stable High risk medication use     ICD-10-CM: Z79.899 ICD-9-CM: V58.69 amiodarone for af  
 ISAÍAS on CPAP     ICD-10-CM: G47.33, Z99.89 ICD-9-CM: 327.23, V46.8 stable Vitals BP Pulse Height(growth percentile) Weight(growth percentile) BMI Smoking Status 136/80 71 6' 1\" (1.854 m) 279 lb (126.6 kg) 36.81 kg/m2 Former Smoker Vitals History BMI and BSA Data Body Mass Index Body Surface Area  
 36.81 kg/m 2 2.55 m 2 Your Updated Medication List  
  
   
This list is accurate as of 6/5/18 11:43 AM.  Always use your most recent med list.  
  
  
  
  
 amiodarone 200 mg tablet Commonly known as:  CORDARONE Take 1 Tab by mouth two (2) times a day. For 2 weeks, then resume 1 tablet daily. apixaban 5 mg tablet Commonly known as:  Alison Mao Take 1 Tab by mouth two (2) times a day. atorvastatin 20 mg tablet Commonly known as:  LIPITOR Take  by mouth daily. CALCARB 600 PO Take  by mouth. fish oil-dha-epa 1,200-144-216 mg Cap Take  by mouth. GARLIQUE PO Take  by mouth.  
  
 multivitamin tablet Commonly known as:  ONE A DAY Take 1 Tab by mouth daily. oxyCODONE-acetaminophen 5-325 mg per tablet Commonly known as:  PERCOCET Take 1 Tab by mouth every four (4) hours as needed. Max Daily Amount: 6 Tabs. VITAMIN C 500 mg tablet Generic drug:  ascorbic acid (vitamin C) Take 1,000 mg by mouth. Follow-up Instructions Return in about 6 months (around 12/5/2018). To-Do List   
 06/05/2018 Lab:  HEPATIC FUNCTION PANEL   
  
 06/05/2018 Lab:  T4, FREE   
  
 06/12/2018 PFT:  PFT DLCO   
  
 06/12/2018 Lab:  TSH 3RD GENERATION Patient Instructions Patient is scheduled to have a PFT at [place] [address] on 7/5/18 @ 10:00. Introducing \[facility] [place] [facility]\"" & HEALTH SERVICES! [facility] introduces [employer] patient portal. Now you can access parts of your medical record, email your doctor's office, and request medication refills online. 1. In your internet browser, go to [email or web address] 2. Click on the First Time User? Click Here link in the Sign In box. You will see the New Member Sign Up page. 3. Enter your Moovit Access Code exactly as it appears below. You will not need to use this code after youve completed the sign-up process.  If you do not sign up before the expiration date, you must request a new code. · Next Big Sound Access Code: [email or web address] Expires: 9/3/2018 10:48 AM 
 
4. Enter the last four digits of your Social Security Number (xxxx) and Date of Birth (mm/dd/yyyy) as indicated and click Submit. You will be taken to the next sign-up page. 5. Create a Next Big Sound ID. This will be your Next Big Sound login ID and cannot be changed, so think of one that is secure and easy to remember. 6. Create a Next Big Sound password. You can change your password at any time. 7. Enter your Password Reset Question and Answer. This can be used at a later time if you forget your password. 8. Enter your e-mail address. You will receive e-mail notification when new information is available in [address] 9. Click Sign Up. You can now view and download portions of your medical record. 10. Click the Download Summary menu link to download a portable copy of your medical information. If you have questions, please visit the Frequently Asked Questions section of the Next Big Sound website. Remember, Next Big Sound is NOT to be used for urgent needs. For medical emergencies, dial 911. Now available from your iPhone and Android! Please provide this summary of care documentation to your next provider. Your primary care clinician is listed as Abhishek Castorena. If you have any questions after today's visit, please call [phone].

## 2018-06-06 NOTE — TELEPHONE ENCOUNTER
Called and spoke to patient concerning supplement, per Dr. Chi Sweeney it is ok to take. He voices understanding and acceptance of this advice and will call back if any further questions or concerns.

## 2018-06-07 ENCOUNTER — TELEPHONE (OUTPATIENT)
Dept: CARDIOLOGY CLINIC | Age: 58
End: 2018-06-07

## 2018-06-07 DIAGNOSIS — Z79.899 HIGH RISK MEDICATION USE: Primary | ICD-10-CM

## 2018-06-11 DIAGNOSIS — I48.0 PAF (PAROXYSMAL ATRIAL FIBRILLATION) (HCC): ICD-10-CM

## 2018-06-11 DIAGNOSIS — Z79.899 HIGH RISK MEDICATION USE: ICD-10-CM

## 2018-06-14 RX ORDER — AMIODARONE HYDROCHLORIDE 200 MG/1
200 TABLET ORAL DAILY
Qty: 90 TAB | Refills: 3 | Status: SHIPPED | OUTPATIENT
Start: 2018-06-14 | End: 2022-07-11 | Stop reason: SDUPTHER

## 2018-06-14 RX ORDER — ATORVASTATIN CALCIUM 20 MG/1
20 TABLET, FILM COATED ORAL DAILY
Qty: 90 TAB | Refills: 3 | Status: SHIPPED | OUTPATIENT
Start: 2018-06-14 | End: 2022-07-11 | Stop reason: SDUPTHER

## 2018-06-22 PROBLEM — E66.01 SEVERE OBESITY (BMI 35.0-39.9): Status: ACTIVE | Noted: 2018-06-22

## 2018-07-11 DIAGNOSIS — I48.0 PAF (PAROXYSMAL ATRIAL FIBRILLATION) (HCC): ICD-10-CM

## 2018-07-11 DIAGNOSIS — Z79.899 HIGH RISK MEDICATION USE: ICD-10-CM

## 2018-09-04 ENCOUNTER — TELEPHONE (OUTPATIENT)
Dept: CARDIOLOGY CLINIC | Age: 58
End: 2018-09-04

## 2018-09-04 DIAGNOSIS — E78.5 HYPERLIPIDEMIA, UNSPECIFIED HYPERLIPIDEMIA TYPE: Primary | ICD-10-CM

## 2018-09-07 DIAGNOSIS — Z79.899 HIGH RISK MEDICATION USE: ICD-10-CM

## 2018-09-24 ENCOUNTER — TELEPHONE (OUTPATIENT)
Dept: CARDIOLOGY CLINIC | Age: 58
End: 2018-09-24

## 2018-09-24 NOTE — TELEPHONE ENCOUNTER
Called and left VM on patient phone regarding lab/test, per Dr. Taurus Leroy to continue current treatment and repeat lab in 3 months. If any questions to call office.

## 2018-09-24 NOTE — TELEPHONE ENCOUNTER
----- Message from Italia Heredia sent at 9/24/2018 11:53 AM EDT -----  Regarding: liver function results   Contact: [phone]  Patient would like to receive recent liver function results

## 2018-12-20 ENCOUNTER — OFFICE VISIT (OUTPATIENT)
Dept: CARDIOLOGY CLINIC | Age: 58
End: 2018-12-20

## 2018-12-20 ENCOUNTER — TELEPHONE (OUTPATIENT)
Dept: CARDIOLOGY CLINIC | Age: 58
End: 2018-12-20

## 2018-12-20 VITALS
HEIGHT: 73 IN | WEIGHT: 276 LBS | DIASTOLIC BLOOD PRESSURE: 83 MMHG | HEART RATE: 77 BPM | SYSTOLIC BLOOD PRESSURE: 140 MMHG | BODY MASS INDEX: 36.58 KG/M2

## 2018-12-20 DIAGNOSIS — Z99.89 OSA ON CPAP: ICD-10-CM

## 2018-12-20 DIAGNOSIS — I45.10 INCOMPLETE RBBB: ICD-10-CM

## 2018-12-20 DIAGNOSIS — Z86.79 S/P ABLATION OF ATRIAL FIBRILLATION: ICD-10-CM

## 2018-12-20 DIAGNOSIS — G47.33 OSA ON CPAP: ICD-10-CM

## 2018-12-20 DIAGNOSIS — Z98.890 S/P ABLATION OF ATRIAL FIBRILLATION: ICD-10-CM

## 2018-12-20 DIAGNOSIS — Z79.899 HIGH RISK MEDICATION USE: Primary | ICD-10-CM

## 2018-12-20 DIAGNOSIS — E78.5 HYPERLIPIDEMIA, UNSPECIFIED HYPERLIPIDEMIA TYPE: ICD-10-CM

## 2018-12-20 DIAGNOSIS — I48.0 PAF (PAROXYSMAL ATRIAL FIBRILLATION) (HCC): Primary | ICD-10-CM

## 2018-12-20 DIAGNOSIS — Z79.899 HIGH RISK MEDICATION USE: ICD-10-CM

## 2018-12-20 NOTE — PROGRESS NOTES
1. Have you been to the ER, urgent care clinic since your last visit? Hospitalized since your last visit? No     2. Have you seen or consulted any other health care providers outside of the [address] [place] [address] since your last visit? Include any pap smears or colon screening. Yes Where: PCP     3. Since your last visit, have you had any of the following symptoms? .          4. Have you had any blood work, X-rays or cardiac testing? 5. Where do you normally have your labs drawn? 6. Do you need any refills today?

## 2018-12-20 NOTE — LETTER
Mia Dior 1960 12/20/2018 Dear TERRANCE Mancia MD 
 
I had the pleasure of evaluating  Mr. Vitor Encinas in office today. Below are the relevant portions of my assessment and plan of care. ICD-10-CM ICD-9-CM 1. PAF (paroxysmal atrial fibrillation) (HCC) I48.0 427.31 Stable on amiodarone continue treatment 2. High risk medication use Z79.899 V58.69 Amiodarone for A. fib 3. Incomplete RBBB I45.10 426.4 4. S/P ablation of atrial fibrillation Z98.890 V45.89   
 Z86.79 Stable 5. ISAÍAS on CPAP G47.33 327.23   
 Z99.89 V46.8 Stable on treatment 6. Hyperlipidemia, unspecified hyperlipidemia type E78.5 272.4 On treatment lab with PCP Current Outpatient Medications Medication Sig Dispense Refill  calcium citrate 200 mg (950 mg) tablet 950 mg.    
 omega 3-dha-epa-fish oil 60- mg cap 500 mg.  apixaban (ELIQUIS) 5 mg tablet Take 1 Tab by mouth two (2) times a day. 180 Tab 3  
 atorvastatin (LIPITOR) 20 mg tablet Take 1 Tab by mouth daily. 90 Tab 3  
 amiodarone (CORDARONE) 200 mg tablet Take 1 Tab by mouth daily. 90 Tab 3  
 garlic (GARLIQUE PO) Take  by mouth.  CALCIUM CARBONATE (CALCARB 600 PO) Take  by mouth.  ascorbic acid, vitamin C, (VITAMIN C) 500 mg tablet Take 1,000 mg by mouth.  fish oil-dha-epa 1,200-144-216 mg cap Take  by mouth.  multivitamin (ONE A DAY) tablet Take 1 Tab by mouth daily. No orders of the defined types were placed in this encounter. If you have questions, please do not hesitate to call me. I look forward to following Mr. Vitor Encinas along with you. Sincerely, Estefany Robledo MD

## 2018-12-20 NOTE — PATIENT INSTRUCTIONS
CloudSlides Activation    Thank you for requesting access to CloudSlides. Please follow the instructions below to securely access and download your online medical record. CloudSlides allows you to send messages to your doctor, view your test results, renew your prescriptions, schedule appointments, and more. How Do I Sign Up? 1. In your internet browser, go to [email or web address]. 2. Click on the First Time User? Click Here link in the Sign In box. You will see the New Member Sign Up page. 3. Enter your CloudSlides Access Code exactly as it appears below. You will not need to use this code after youve completed the sign-up process. If you do not sign up before the expiration date, you must request a new code. CloudSlides Access Code: [email or web address]  Expires: 3/20/2019 10:09 AM (This is the date your CloudSlides access code will )    4. Enter the last four digits of your Social Security Number (xxxx) and Date of Birth (mm/dd/yyyy) as indicated and click Submit. You will be taken to the next sign-up page. 5. Create a CloudSlides ID. This will be your CloudSlides login ID and cannot be changed, so think of one that is secure and easy to remember. 6. Create a CloudSlides password. You can change your password at any time. 7. Enter your Password Reset Question and Answer. This can be used at a later time if you forget your password. 8. Enter your e-mail address. You will receive e-mail notification when new information is available in [address] 9. Click Sign Up. You can now view and download portions of your medical record. 10. Click the Download Summary menu link to download a portable copy of your medical information. Additional Information    If you have questions, please visit the Frequently Asked Questions section of the CloudSlides website at [email or web address] Remember, CloudSlides is NOT to be used for urgent needs. For medical emergencies, dial 911. Requested labs from PCP. Per PCP last labs done in 6/18, we already have.

## 2018-12-20 NOTE — PROGRESS NOTES
HISTORY OF PRESENT ILLNESS  Lisa Booker is a 62 y.o. male. Patient with paf,hyperlipidemia  On follow up patient denies any chest pains,sob, palpitation or other significant symptoms. One episode of extreme fatigue while in house-resolved with rest-no recurrence      Palpitations    The history is provided by the patient. This is a new problem. The problem has been resolved. The problem is associated with nothing. Pertinent negatives include no fever, no chest pain, no claudication, no orthopnea, no PND, no abdominal pain, no nausea, no vomiting, no headaches, no dizziness, no weakness, no cough, no hemoptysis, no shortness of breath and no sputum production. His past medical history is significant for hypertension. Hypertension   The history is provided by the patient. This is a chronic problem. The problem occurs constantly. The problem has not changed since onset. Pertinent negatives include no chest pain, no abdominal pain, no headaches and no shortness of breath. Cholesterol Problem   The history is provided by the patient. This is a chronic problem. The problem occurs constantly. The problem has not changed since onset. Pertinent negatives include no chest pain, no abdominal pain, no headaches and no shortness of breath. Review of Systems   Constitutional: Negative for chills and fever. HENT: Negative for nosebleeds. Eyes: Negative for blurred vision and double vision. Respiratory: Negative for cough, hemoptysis, sputum production, shortness of breath and wheezing. Cardiovascular: Positive for palpitations. Negative for chest pain, orthopnea, claudication, leg swelling and PND. Gastrointestinal: Negative for abdominal pain, heartburn, nausea and vomiting. Musculoskeletal: Negative for myalgias. Skin: Negative for rash. Neurological: Negative for dizziness, weakness and headaches. Endo/Heme/Allergies: Does not bruise/bleed easily.      Family History   Problem Relation Age of Onset    Stroke Father        Past Medical History:   Diagnosis Date    Atrial fibrillation ([address]     Blood in urine     Hyperlipidemia     ISAÍAS on CPAP 2018    stable    Sleep apnea        Past Surgical History:   Procedure Laterality Date    HX HERNIA REPAIR         Social History     Tobacco Use    Smoking status: Former Smoker     Types: Cigarettes     Last attempt to quit: 3/9/2013     Years since quittin.7    Smokeless tobacco: Never Used   Substance Use Topics    Alcohol use: Yes     Alcohol/week: 0.0 oz     Comment: OCC        No Known Allergies        Visit Vitals  /83   Pulse 77   Ht 6' 1\" (1.854 m)   Wt 125.2 kg (276 lb)   BMI 36.41 kg/m²         Physical Exam   Constitutional: He is oriented to person, place, and time. He appears well-developed and well-nourished. HENT:   Head: Normocephalic and atraumatic. Eyes: Conjunctivae are normal.   Neck: Neck supple. No JVD present. No tracheal deviation present. No thyromegaly present. Cardiovascular: Normal rate and normal heart sounds. An irregularly irregular rhythm present. Exam reveals no gallop and no friction rub. No murmur heard. Pulmonary/Chest: Breath sounds normal. No respiratory distress. He has no wheezes. He has no rales. He exhibits no tenderness. Abdominal: Soft. There is no tenderness. Musculoskeletal: He exhibits no edema. Neurological: He is alert and oriented to person, place, and time. Skin: Skin is warm and dry. Psychiatric: He has a normal mood and affect. Mr. Lauren Tom has a reminder for a \"due or due soon\" health maintenance. I have asked that he contact his primary care provider for follow-up on this health maintenance. I have personally reviewed patient's records available from hospital and other providers and incorporated findings in patient care. pcp-3/2016  SUMMARY:echo:2016  Left ventricle: Systolic function was normal. Ejection fraction was  estimated to be 60 %.  No obvious wall motion abnormalities identified in  the views obtained. There was mild concentric hypertrophy. Doppler  parameters were consistent with abnormal left ventricular relaxation  (grade 1 diastolic dysfunction). Mitral valve: There was trivial regurgitation. NUCLEAR IMAGIN2016     Findings:   1. Stress images reveal normal Myoview distrubution in all the LV segments in short axis, vertical and horizontal long axis views. 2. Resting images have a normal uptake. 3. Gated images reveal normal wall motion and the ejection fraction is calculated to be 85%. Conclusion:   1. Normal perfusion scan. 2. Normal wall motion and ejection fraction. 3. Low risk scan.  2016-event monitor  Sr,st,no afib,? Svt  2018  1. Persistent atrial fibrillation (HCC) [I48.1]   Post-procedure Diagnoses:   1. Atrial fibrillation, currently in sinus rhythm [Z86.79]   Procedures:   1. CARDIOVERSION, ELECTIVE [[id number]]      []Hide copied text  []Hover for attribution information  Sedation provided by anesthesiology  200J x 1 DCCV provided. Patient converted to NSR  No complications         1780  Atrial fibrillation   -Incomplete right bundle branch block. ABNORMAL     PROCEDURE -2018  -Atrial fibrillation pulmonary vein isolation ablation using [employer].  -Cardiac electrophysiology study. 2018  PFT-normal diffusion capacity  Assessment         ICD-10-CM ICD-9-CM    1. PAF (paroxysmal atrial fibrillation) ([facility]) I48.0 427.31     Stable on amiodarone continue treatment   2. High risk medication use Z79.899 V58.69     Amiodarone for A. fib   3. Incomplete RBBB I45.10 426.4    4. S/P ablation of atrial fibrillation Z98.890 V45.89     Z86.79      Stable   5. ISAÍAS on CPAP G47.33 327.23     Z99.89 V46.8     Stable on treatment   6.  Hyperlipidemia, unspecified hyperlipidemia type E78.5 272.4     On treatment lab with PCP   chads-vasc score low  Will use asa every day    2017  Started eliquis for recurrent af-stopped asa  ? Antiarrhythmic in 4 weeks  I have discussed risk benefit and option of use of amiodarone for arrythmia. Risk of toxicity with medication are informed. Patient will require careful monitoring. 12/2017-persistant a fib  Started amiodarone-cardioversion in 4 weeks  1/2018  Cardioverted to sr  2/2018  Recurrent a fib  Awaiting sleep study result  Discussed ablation-  12/2018  Maintaining sinus rhythm after ablation in 4/2018. Recent PFT normal.  Follow-up labs  There are no discontinued medications. No orders of the defined types were placed in this encounter. Follow-up Disposition:  Return in about 6 months (around 6/20/2019) for get labs from pcp.

## 2018-12-21 DIAGNOSIS — E78.5 HYPERLIPIDEMIA, UNSPECIFIED HYPERLIPIDEMIA TYPE: ICD-10-CM

## 2018-12-29 NOTE — TELEPHONE ENCOUNTER
Called and left message on patient voicemail concerning lab/test Per Dr. Nai De Leon to repeat LFT in 3 months. If any questions to call office. 29-Dec-2018 15:09

## 2019-01-24 DIAGNOSIS — Z79.899 HIGH RISK MEDICATION USE: ICD-10-CM

## 2019-06-11 LAB — LDL-C, EXTERNAL: 114

## 2019-06-20 ENCOUNTER — OFFICE VISIT (OUTPATIENT)
Dept: CARDIOLOGY CLINIC | Age: 59
End: 2019-06-20

## 2019-06-20 VITALS
DIASTOLIC BLOOD PRESSURE: 87 MMHG | WEIGHT: 278.8 LBS | BODY MASS INDEX: 36.95 KG/M2 | HEART RATE: 74 BPM | HEIGHT: 73 IN | SYSTOLIC BLOOD PRESSURE: 146 MMHG

## 2019-06-20 DIAGNOSIS — Z86.79 S/P ABLATION OF ATRIAL FIBRILLATION: ICD-10-CM

## 2019-06-20 DIAGNOSIS — Z79.899 HIGH RISK MEDICATION USE: ICD-10-CM

## 2019-06-20 DIAGNOSIS — E78.5 HYPERLIPIDEMIA, UNSPECIFIED HYPERLIPIDEMIA TYPE: ICD-10-CM

## 2019-06-20 DIAGNOSIS — Z99.89 OSA ON CPAP: ICD-10-CM

## 2019-06-20 DIAGNOSIS — G47.33 OSA ON CPAP: ICD-10-CM

## 2019-06-20 DIAGNOSIS — I48.0 PAF (PAROXYSMAL ATRIAL FIBRILLATION) (HCC): Primary | ICD-10-CM

## 2019-06-20 DIAGNOSIS — Z98.890 S/P ABLATION OF ATRIAL FIBRILLATION: ICD-10-CM

## 2019-06-20 NOTE — PROGRESS NOTES
1. Have you been to the ER, urgent care clinic since your last visit? Hospitalized since your last visit? Yes     2. Have you seen or consulted any other health care providers outside of the [address] [place] [address] since your last visit? Include any pap smears or colon screening. PCP and Dentist     3. Since your last visit, have you had any of the following symptoms?      palpitations and shortness of breath. Explain: with exerction    4. Have you had any blood work, X-rays or cardiac testing? Yes      5. Where do you normally have your labs drawn? [facility]    6. Do you need any refills today?    No

## 2019-06-20 NOTE — PATIENT INSTRUCTIONS
[employer] Activation    Thank you for requesting access to [employer] Please follow the instructions below to securely access and download your online medical record. [employer] allows you to send messages to your doctor, view your test results, renew your prescriptions, schedule appointments, and more. How Do I Sign Up? 1. In your internet browser, go to [email or web address]. 2. Click on the First Time User? Click Here link in the Sign In box. You will see the New Member Sign Up page. 3. Enter your [employer] Access Code exactly as it appears below. You will not need to use this code after youve completed the sign-up process. If you do not sign up before the expiration date, you must request a new code. [employer] Access Code: [email or web address]  Expires: 2019 10:41 AM (This is the date your [employer] access code will )    4. Enter the last four digits of your Social Security Number (xxxx) and Date of Birth (mm/dd/yyyy) as indicated and click Submit. You will be taken to the next sign-up page. 5. Create a [employer] ID. This will be your [employer] login ID and cannot be changed, so think of one that is secure and easy to remember. 6. Create a [employer] password. You can change your password at any time. 7. Enter your Password Reset Question and Answer. This can be used at a later time if you forget your password. 8. Enter your e-mail address. You will receive e-mail notification when new information is available in [employer]. 9. Click Sign Up. You can now view and download portions of your medical record. 10. Click the Download Summary menu link to download a portable copy of your medical information. Additional Information    If you have questions, please visit the Frequently Asked Questions section of the [employer] website at [email or web address]/. Remember, [employer] is NOT to be used for urgent needs. For medical emergencies, dial 911.       Requested labs from PCP.    Patient is scheduled for PFT at [place] [address] on 8/9/19 @ 10:00.

## 2019-06-20 NOTE — PROGRESS NOTES
HISTORY OF PRESENT ILLNESS  Gracy Mclaughlin is a 61 y.o. male. Patient with paf,hyperlipidemia  On follow up patient denies any chest painspalpitation or other significant symptoms. One episode of extreme fatigue while in house-resolved with rest-no recurrence  6/2019 remains fatigue at times feels worse. Short of breath on exertion. No orthopnea PND or peripheral edema. No chest pain    Palpitations    The history is provided by the patient. This is a new problem. The problem has been resolved. The problem is associated with nothing. Pertinent negatives include no fever, no chest pain, no claudication, no orthopnea, no PND, no abdominal pain, no nausea, no vomiting, no headaches, no dizziness, no weakness, no cough, no hemoptysis, no shortness of breath and no sputum production. His past medical history is significant for hypertension. Hypertension   The history is provided by the patient. This is a chronic problem. The problem occurs constantly. The problem has not changed since onset. Pertinent negatives include no chest pain, no abdominal pain, no headaches and no shortness of breath. Cholesterol Problem   The history is provided by the patient. This is a chronic problem. The problem occurs constantly. The problem has not changed since onset. Pertinent negatives include no chest pain, no abdominal pain, no headaches and no shortness of breath. Review of Systems   Constitutional: Negative for chills and fever. HENT: Negative for nosebleeds. Eyes: Negative for blurred vision and double vision. Respiratory: Negative for cough, hemoptysis, sputum production, shortness of breath and wheezing. Cardiovascular: Positive for palpitations. Negative for chest pain, orthopnea, claudication, leg swelling and PND. Gastrointestinal: Negative for abdominal pain, heartburn, nausea and vomiting. Musculoskeletal: Negative for myalgias. Skin: Negative for rash.    Neurological: Negative for dizziness, weakness and headaches. Endo/Heme/Allergies: Does not bruise/bleed easily. Family History   Problem Relation Age of Onset    Stroke Father        Past Medical History:   Diagnosis Date    Atrial fibrillation ([address]     Blood in urine     Hyperlipidemia     ISAÍAS on CPAP 2018    stable    Sleep apnea        Past Surgical History:   Procedure Laterality Date    HX HERNIA REPAIR         Social History     Tobacco Use    Smoking status: Former Smoker     Types: Cigarettes     Last attempt to quit: 3/9/2013     Years since quittin.2    Smokeless tobacco: Never Used   Substance Use Topics    Alcohol use: Yes     Alcohol/week: 0.0 oz     Comment: OCC        No Known Allergies        Visit Vitals  /89 (BP 1 Location: Right arm, BP Patient Position: Sitting) Comment: After running out to car for medication list   Pulse 72   Ht 6' 1\" (1.854 m)   Wt 126.5 kg (278 lb 12.8 oz)   BMI 36.78 kg/m²         Physical Exam   Constitutional: He is oriented to person, place, and time. He appears well-developed and well-nourished. HENT:   Head: Normocephalic and atraumatic. Eyes: Conjunctivae are normal.   Neck: Neck supple. No JVD present. No tracheal deviation present. No thyromegaly present. Cardiovascular: Normal rate and normal heart sounds. An irregularly irregular rhythm present. Exam reveals no gallop and no friction rub. No murmur heard. Pulmonary/Chest: Breath sounds normal. No respiratory distress. He has no wheezes. He has no rales. He exhibits no tenderness. Abdominal: Soft. There is no tenderness. Musculoskeletal: He exhibits no edema. Neurological: He is alert and oriented to person, place, and time. Skin: Skin is warm and dry. Psychiatric: He has a normal mood and affect. Mr. Jemima Morejon has a reminder for a \"due or due soon\" health maintenance. I have asked that he contact his primary care provider for follow-up on this health maintenance.     I have personally reviewed patient's records available from hospital and other providers and incorporated findings in patient care. pcp-3/2016  SUMMARY:echo:2016  Left ventricle: Systolic function was normal. Ejection fraction was  estimated to be 60 %. No obvious wall motion abnormalities identified in  the views obtained. There was mild concentric hypertrophy. Doppler  parameters were consistent with abnormal left ventricular relaxation  (grade 1 diastolic dysfunction). Mitral valve: There was trivial regurgitation. NUCLEAR IMAGIN2016     Findings:   1. Stress images reveal normal Myoview distrubution in all the LV segments in short axis, vertical and horizontal long axis views. 2. Resting images have a normal uptake. 3. Gated images reveal normal wall motion and the ejection fraction is calculated to be 85%. Conclusion:   1. Normal perfusion scan. 2. Normal wall motion and ejection fraction. 3. Low risk scan.  2016-event monitor  Sr,st,no afib,? Svt  2018  1. Persistent atrial fibrillation (HCC) [I48.1]   Post-procedure Diagnoses:   1. Atrial fibrillation, currently in sinus rhythm [Z86.79]   Procedures:   1. CARDIOVERSION, ELECTIVE [[id number]]      []Hide copied text  []Hover for attribution information  Sedation provided by anesthesiology  200J x 1 DCCV provided. Patient converted to NSR  No complications         3/0606  Atrial fibrillation   -Incomplete right bundle branch block. ABNORMAL     PROCEDURE -2018  -Atrial fibrillation pulmonary vein isolation ablation using Medtronic Arctic Front Cryoballoon.  -Cardiac electrophysiology study. 2018  PFT-normal diffusion capacity  Assessment         ICD-10-CM ICD-9-CM    1. PAF (paroxysmal atrial fibrillation) (HCC) I48.0 427.31 TSH 3RD GENERATION      T4, FREE      METABOLIC PANEL, BASIC      CBC WITH AUTOMATED DIFF      PFT DLCO      HEPATIC FUNCTION PANEL      ECHO ADULT COMPLETE    Stable maintained on amiodarone and eliquis   2.  High risk medication use Z79.899 V58.69 TSH 3RD GENERATION      T4, FREE      METABOLIC PANEL, BASIC      CBC WITH AUTOMATED DIFF      PFT DLCO      HEPATIC FUNCTION PANEL      ECHO ADULT COMPLETE    Amiodarone for A. fib. Follow-up labs   3. S/P ablation of atrial fibrillation Z98.890 V45.89     Z86.79      Stable   4. ISAÍAS on CPAP G47.33 327.23     Z99.89 V46.8     Continue treatment   5. Hyperlipidemia, unspecified hyperlipidemia type E78.5 272.4     Continue therapy lab with PCP       11/2017  Started eliquis for recurrent af-stopped asa  ? Antiarrhythmic in 4 weeks  I have discussed risk benefit and option of use of amiodarone for arrythmia. Risk of toxicity with medication are informed. Patient will require careful monitoring. 12/2017-persistant a fib  Started amiodarone-cardioversion in 4 weeks  1/2018  Cardioverted to sr  2/2018  Recurrent a fib  Awaiting sleep study result  Discussed ablation-  12/2018  Maintaining sinus rhythm after ablation in 4/2018. Recent PFT normal.  Follow-up labs  There are no discontinued medications. Orders Placed This Encounter    TSH 3RD GENERATION     Standing Status:   Future     Standing Expiration Date:   7/20/2019    T4, FREE     Standing Status:   Future     Standing Expiration Date:   2/67/0040    METABOLIC PANEL, BASIC     Standing Status:   Future     Standing Expiration Date:   7/20/2019    CBC WITH AUTOMATED DIFF     Standing Status:   Future     Standing Expiration Date:   7/20/2019    HEPATIC FUNCTION PANEL     Standing Status:   Future     Standing Expiration Date:   12/19/2019    PFT DLCO     Standing Status:   Future     Standing Expiration Date:   12/18/2019       Follow-up and Dispositions    · Return in about 3 months (around 9/20/2019) for get labs from pcp.

## 2019-12-10 ENCOUNTER — TELEPHONE (OUTPATIENT)
Dept: CARDIOLOGY CLINIC | Age: 59
End: 2019-12-10

## 2019-12-10 DIAGNOSIS — E78.5 HYPERLIPIDEMIA, UNSPECIFIED HYPERLIPIDEMIA TYPE: Primary | ICD-10-CM

## 2019-12-10 NOTE — TELEPHONE ENCOUNTER
Per paper message (labs) that were reviewed by Dr Zoë Reynaga to have patient repeat LFT's in 2 months and show Dr Tami Lowery. Labs in Dr. Nimco Manzano folder to review and will mail lad order to patient.

## 2019-12-16 ENCOUNTER — OFFICE VISIT (OUTPATIENT)
Dept: CARDIOLOGY CLINIC | Age: 59
End: 2019-12-16

## 2019-12-16 VITALS
HEART RATE: 73 BPM | BODY MASS INDEX: 37.24 KG/M2 | SYSTOLIC BLOOD PRESSURE: 159 MMHG | HEIGHT: 73 IN | DIASTOLIC BLOOD PRESSURE: 81 MMHG | WEIGHT: 281 LBS

## 2019-12-16 DIAGNOSIS — E78.5 HYPERLIPIDEMIA, UNSPECIFIED HYPERLIPIDEMIA TYPE: ICD-10-CM

## 2019-12-16 DIAGNOSIS — Z79.899 HIGH RISK MEDICATION USE: ICD-10-CM

## 2019-12-16 DIAGNOSIS — G47.33 OSA ON CPAP: ICD-10-CM

## 2019-12-16 DIAGNOSIS — Z99.89 OSA ON CPAP: ICD-10-CM

## 2019-12-16 DIAGNOSIS — I48.0 PAF (PAROXYSMAL ATRIAL FIBRILLATION) (HCC): ICD-10-CM

## 2019-12-16 DIAGNOSIS — Z86.79 S/P ABLATION OF ATRIAL FIBRILLATION: ICD-10-CM

## 2019-12-16 DIAGNOSIS — I48.0 PAF (PAROXYSMAL ATRIAL FIBRILLATION) (HCC): Primary | ICD-10-CM

## 2019-12-16 DIAGNOSIS — Z98.890 S/P ABLATION OF ATRIAL FIBRILLATION: ICD-10-CM

## 2019-12-16 NOTE — PROGRESS NOTES
1. Have you been to the ER, urgent care clinic since your last visit? Hospitalized since your last visit? No    2. Have you seen or consulted any other health care providers outside of the [address] [place] [address] since your last visit? Include any pap smears or colon screening.  No

## 2019-12-16 NOTE — PROGRESS NOTES
HISTORY OF PRESENT ILLNESS  Natalie Chester is a 61 y.o. male. Patient with paf,hyperlipidemia  On follow up patient denies any chest painspalpitation or other significant symptoms. One episode of extreme fatigue while in house-resolved with rest-no recurrence  6/2019 remains fatigue at times feels worse. Short of breath on exertion. No orthopnea PND or peripheral edema. No chest pain    Palpitations    The history is provided by the patient. This is a new problem. The problem has been resolved. The problem is associated with nothing. Pertinent negatives include no fever, no chest pain, no claudication, no orthopnea, no PND, no abdominal pain, no nausea, no vomiting, no headaches, no dizziness, no weakness, no cough, no hemoptysis, no shortness of breath and no sputum production. His past medical history is significant for hypertension. Hypertension   The history is provided by the patient. This is a chronic problem. The problem occurs constantly. The problem has not changed since onset. Pertinent negatives include no chest pain, no abdominal pain, no headaches and no shortness of breath. Cholesterol Problem   The history is provided by the patient. This is a chronic problem. The problem occurs constantly. The problem has not changed since onset. Pertinent negatives include no chest pain, no abdominal pain, no headaches and no shortness of breath. Review of Systems   Constitutional: Negative for chills and fever. HENT: Negative for nosebleeds. Eyes: Negative for blurred vision and double vision. Respiratory: Negative for cough, hemoptysis, sputum production, shortness of breath and wheezing. Cardiovascular: Positive for palpitations. Negative for chest pain, orthopnea, claudication, leg swelling and PND. Gastrointestinal: Negative for abdominal pain, heartburn, nausea and vomiting. Musculoskeletal: Negative for myalgias. Skin: Negative for rash.    Neurological: Negative for dizziness, weakness and headaches. Endo/Heme/Allergies: Does not bruise/bleed easily. Family History   Problem Relation Age of Onset    Stroke Father        Past Medical History:   Diagnosis Date    Atrial fibrillation ([address]     Blood in urine     Hyperlipidemia     ISAÍAS on CPAP 2018    stable    Sleep apnea        Past Surgical History:   Procedure Laterality Date    HX HERNIA REPAIR         Social History     Tobacco Use    Smoking status: Former Smoker     Types: Cigarettes     Last attempt to quit: 3/9/2013     Years since quittin.7    Smokeless tobacco: Never Used   Substance Use Topics    Alcohol use: Yes     Alcohol/week: 0.0 standard drinks     Comment: OCC        No Known Allergies        Visit Vitals  /81   Pulse 73   Ht 6' 1\" (1.854 m)   Wt 127.5 kg (281 lb)   BMI 37.07 kg/m²         Physical Exam   Constitutional: He is oriented to person, place, and time. He appears well-developed and well-nourished. HENT:   Head: Normocephalic and atraumatic. Eyes: Conjunctivae are normal.   Neck: Neck supple. No JVD present. No tracheal deviation present. No thyromegaly present. Cardiovascular: Normal rate and normal heart sounds. An irregularly irregular rhythm present. Exam reveals no gallop and no friction rub. No murmur heard. Pulmonary/Chest: Breath sounds normal. No respiratory distress. He has no wheezes. He has no rales. He exhibits no tenderness. Abdominal: Soft. There is no tenderness. Musculoskeletal:         General: No edema. Neurological: He is alert and oriented to person, place, and time. Skin: Skin is warm and dry. Psychiatric: He has a normal mood and affect. Mr. Angelic Childs has a reminder for a \"due or due soon\" health maintenance. I have asked that he contact his primary care provider for follow-up on this health maintenance.     I have personally reviewed patient's records available from hospital and other providers and incorporated findings in patient care.  pcp-3/2016  SUMMARY:echo:2016  Left ventricle: Systolic function was normal. Ejection fraction was  estimated to be 60 %. No obvious wall motion abnormalities identified in  the views obtained. There was mild concentric hypertrophy. Doppler  parameters were consistent with abnormal left ventricular relaxation  (grade 1 diastolic dysfunction). Mitral valve: There was trivial regurgitation. NUCLEAR IMAGIN2016     Findings:   1. Stress images reveal normal Myoview distrubution in all the LV segments in short axis, vertical and horizontal long axis views. 2. Resting images have a normal uptake. 3. Gated images reveal normal wall motion and the ejection fraction is calculated to be 85%. Conclusion:   1. Normal perfusion scan. 2. Normal wall motion and ejection fraction. 3. Low risk scan.  2016-event monitor  Sr,st,no afib,? Svt  2018  1. Persistent atrial fibrillation (HCC) [I48.1]   Post-procedure Diagnoses:   1. Atrial fibrillation, currently in sinus rhythm [Z86.79]   Procedures:   1. CARDIOVERSION, ELECTIVE [[id number]]      []Hide copied text  [][employer] for attribution information  Sedation provided by anesthesiology  200J x 1 DCCV provided. Patient converted to NSR  No complications         3967  Atrial fibrillation   -Incomplete right bundle branch block. ABNORMAL     PROCEDURE -2018  -Atrial fibrillation pulmonary vein isolation ablation using [employer] Arctic Front Cryoballoon.  -Cardiac electrophysiology study. 2018  PFT-normal diffusion capacity      Interpretation Summary 2019    · Left Ventricle: Normal cavity size and systolic function (ejection fraction normal). Mild concentric hypertrophy. Estimated left ventricular ejection fraction is 66 - 70%. No regional wall motion abnormality noted. Moderate (grade 2) left ventricular diastolic dysfunction. · Right Ventricle: Mildly dilated right ventricle. · Right Atrium: Mildly dilated right atrium.   · Mitral Valve: Mitral valve thickening. Mild mitral valve regurgitation is present. · Tricuspid Valve: Mild tricuspid valve regurgitation is present. · Pulmonary Artery: There is no evidence of pulmonary hypertension. Assessment         ICD-10-CM ICD-9-CM    1. PAF (paroxysmal atrial fibrillation) (HCC) I48.0 427.31     Stable continue current medical management   2. High risk medication use Z79.899 V58.69     On amiodarone. Continue therapy monitor   3. S/P ablation of atrial fibrillation Z98.890 V45.89     Z86.79      Stable   4. Hyperlipidemia, unspecified hyperlipidemia type E78.5 272.4     Continue treatment follow blood with PCP   5. ISAÍAS on CPAP G47.33 327.23     Z99.89 V46.8     Continue treatment       11/2017  Started eliquis for recurrent af-stopped asa  ? Antiarrhythmic in 4 weeks  I have discussed risk benefit and option of use of amiodarone for arrythmia. Risk of toxicity with medication are informed. Patient will require careful monitoring. 12/2017-persistant a fib  Started amiodarone-cardioversion in 4 weeks  1/2018  Cardioverted to sr  2/2018  Recurrent a fib  Awaiting sleep study result  Discussed ablation-  12/2018  Maintaining sinus rhythm after ablation in 4/2018. Recent PFT normal.  Follow-up labs  [facility]  Cardiac status stable. Blood pressure elevated start with salt restriction follow-up in 3 months        There are no discontinued medications. No orders of the defined types were placed in this encounter. Follow-up and Dispositions    · Return in about 3 months (around 3/16/2020).

## 2019-12-16 NOTE — PATIENT INSTRUCTIONS
DASH Diet: Care Instructions  Your Care Instructions    The DASH diet is an eating plan that can help lower your blood pressure. DASH stands for Dietary Approaches to Stop Hypertension. Hypertension is high blood pressure. The DASH diet focuses on eating foods that are high in calcium, potassium, and magnesium. These nutrients can lower blood pressure. The foods that are highest in these nutrients are fruits, vegetables, low-fat dairy products, nuts, seeds, and legumes. But taking calcium, potassium, and magnesium supplements instead of eating foods that are high in those nutrients does not have the same effect. The DASH diet also includes whole grains, fish, and poultry. The DASH diet is one of several lifestyle changes your doctor may recommend to lower your high blood pressure. Your doctor may also want you to decrease the amount of sodium in your diet. Lowering sodium while following the DASH diet can lower blood pressure even further than just the DASH diet alone. Follow-up care is a key part of your treatment and safety. Be sure to make and go to all appointments, and call your doctor if you are having problems. It's also a good idea to know your test results and keep a list of the medicines you take. How can you care for yourself at home? Following the DASH diet  · Eat 4 to 5 servings of fruit each day. A serving is 1 medium-sized piece of fruit, ½ cup chopped or canned fruit, 1/4 cup dried fruit, or 4 ounces (½ cup) of fruit juice. Choose fruit more often than fruit juice. · Eat 4 to 5 servings of vegetables each day. A serving is 1 cup of lettuce or raw leafy vegetables, ½ cup of chopped or cooked vegetables, or 4 ounces (½ cup) of vegetable juice. Choose vegetables more often than vegetable juice. · Get 2 to 3 servings of low-fat and fat-free dairy each day. A serving is 8 ounces of milk, 1 cup of yogurt, or 1 ½ ounces of cheese. · Eat 6 to 8 servings of grains each day.  A serving is 1 slice of bread, 1 ounce of dry cereal, or ½ cup of cooked rice, pasta, or cooked cereal. Try to choose whole-grain products as much as possible. · Limit lean meat, poultry, and fish to 2 servings each day. A serving is 3 ounces, about the size of a deck of cards. · Eat 4 to 5 servings of nuts, seeds, and legumes (cooked dried beans, lentils, and split peas) each week. A serving is 1/3 cup of nuts, 2 tablespoons of seeds, or ½ cup of cooked beans or peas. · Limit fats and oils to 2 to 3 servings each day. A serving is 1 teaspoon of vegetable oil or 2 tablespoons of salad dressing. · Limit sweets and added sugars to 5 servings or less a week. A serving is 1 tablespoon jelly or jam, ½ cup sorbet, or 1 cup of lemonade. · Eat less than 2,300 milligrams (mg) of sodium a day. If you limit your sodium to 1,500 mg a day, you can lower your blood pressure even more. Tips for success  · Start small. Do not try to make dramatic changes to your diet all at once. You might feel that you are missing out on your favorite foods and then be more likely to not follow the plan. Make small changes, and stick with them. Once those changes become habit, add a few more changes. · Try some of the following:  ? Make it a goal to eat a fruit or vegetable at every meal and at snacks. This will make it easy to get the recommended amount of fruits and vegetables each day. ? Try yogurt topped with fruit and nuts for a snack or healthy dessert. ? Add lettuce, tomato, cucumber, and onion to sandwiches. ? Combine a ready-made pizza crust with low-fat mozzarella cheese and lots of vegetable toppings. Try using tomatoes, squash, spinach, broccoli, carrots, cauliflower, and onions. ? Have a variety of cut-up vegetables with a low-fat dip as an appetizer instead of chips and dip. ? Sprinkle sunflower seeds or chopped almonds over salads. Or try adding chopped walnuts or almonds to cooked vegetables.   ? Try some vegetarian meals using beans and peas. Add garbanzo or kidney beans to salads. Make burritos and tacos with mashed rockwell beans or black beans. Where can you learn more? Go to http://josé-angelo.info/. Enter M718 in the search box to learn more about \"DASH Diet: Care Instructions. \"  Current as of: April 9, 2019  Content Version: 12.2  © 7430-7371 [employer]. Care instructions adapted under license by [employer] (which disclaims liability or warranty for this information). If you have questions about a medical condition or this instruction, always ask your healthcare professional. [address] any warranty or liability for your use of this information.

## 2020-05-26 LAB — LDL-C, EXTERNAL: 83

## 2020-06-10 DIAGNOSIS — E78.5 HYPERLIPIDEMIA, UNSPECIFIED HYPERLIPIDEMIA TYPE: ICD-10-CM

## 2021-06-07 LAB — LDL-C, EXTERNAL: 87

## 2021-06-14 ENCOUNTER — OFFICE VISIT (OUTPATIENT)
Dept: CARDIOLOGY CLINIC | Age: 61
End: 2021-06-14
Payer: COMMERCIAL

## 2021-06-14 VITALS
HEART RATE: 70 BPM | SYSTOLIC BLOOD PRESSURE: 139 MMHG | WEIGHT: 273 LBS | BODY MASS INDEX: 36.18 KG/M2 | DIASTOLIC BLOOD PRESSURE: 73 MMHG | HEIGHT: 73 IN

## 2021-06-14 DIAGNOSIS — Z86.79 S/P ABLATION OF ATRIAL FIBRILLATION: ICD-10-CM

## 2021-06-14 DIAGNOSIS — I48.0 PAF (PAROXYSMAL ATRIAL FIBRILLATION) (HCC): Primary | ICD-10-CM

## 2021-06-14 DIAGNOSIS — E78.5 HYPERLIPIDEMIA, UNSPECIFIED HYPERLIPIDEMIA TYPE: ICD-10-CM

## 2021-06-14 DIAGNOSIS — Z98.890 S/P ABLATION OF ATRIAL FIBRILLATION: ICD-10-CM

## 2021-06-14 DIAGNOSIS — Z79.01 CURRENT USE OF LONG TERM ANTICOAGULATION: ICD-10-CM

## 2021-06-14 DIAGNOSIS — Z79.899 HIGH RISK MEDICATION USE: ICD-10-CM

## 2021-06-14 PROCEDURE — 99214 OFFICE O/P EST MOD 30 MIN: CPT | Performed by: INTERNAL MEDICINE

## 2021-06-14 NOTE — PROGRESS NOTES
HISTORY OF PRESENT ILLNESS  Ness Jeter is a 64 y.o. male. Patient with paf,hyperlipidemia  On follow up patient denies any chest painspalpitation or other significant symptoms. One episode of extreme fatigue while in house-resolved with rest-no recurrence  6/2019 remains fatigue at times feels worse. Short of breath on exertion. No orthopnea PND or peripheral edema. No chest pain    Palpitations   The history is provided by the patient. This is a new problem. The problem has been resolved. The problem is associated with nothing. Pertinent negatives include no fever, no chest pain, no claudication, no orthopnea, no PND, no abdominal pain, no nausea, no vomiting, no headaches, no dizziness, no weakness, no cough, no hemoptysis, no shortness of breath and no sputum production. His past medical history is significant for hypertension. Hypertension  The history is provided by the patient. This is a chronic problem. The problem occurs constantly. The problem has not changed since onset. Pertinent negatives include no chest pain, no abdominal pain, no headaches and no shortness of breath. Cholesterol Problem  The history is provided by the patient. This is a chronic problem. The problem occurs constantly. The problem has not changed since onset. Pertinent negatives include no chest pain, no abdominal pain, no headaches and no shortness of breath. Review of Systems   Constitutional: Negative for chills and fever. HENT: Negative for nosebleeds. Eyes: Negative for blurred vision and double vision. Respiratory: Negative for cough, hemoptysis, sputum production, shortness of breath and wheezing. Cardiovascular: Positive for palpitations. Negative for chest pain, orthopnea, claudication, leg swelling and PND. Gastrointestinal: Negative for abdominal pain, heartburn, nausea and vomiting. Musculoskeletal: Negative for myalgias. Skin: Negative for rash.    Neurological: Negative for dizziness, weakness and headaches. Endo/Heme/Allergies: Does not bruise/bleed easily. Family History   Problem Relation Age of Onset    Stroke Father     Heart Disease Father     Heart Attack Mother     Colon Cancer Mother        Past Medical History:   Diagnosis Date    Acrochordon     Atrial fibrillation ([address]     Blood in urine     Elevated LFTs     Hematuria     Hyperlipidemia     Nevus, non-neoplastic     ISAÍAS on CPAP 2018    stable    Seborrheic keratosis     Sleep apnea        Past Surgical History:   Procedure Laterality Date    HX HERNIA REPAIR         Social History     Tobacco Use    Smoking status: Former Smoker     Types: Cigarettes     Quit date: 3/9/2013     Years since quittin.2    Smokeless tobacco: Never Used   Substance Use Topics    Alcohol use: Yes     Alcohol/week: 0.0 standard drinks     Comment: OCC        No Known Allergies        Visit Vitals  /73   Pulse 70   Ht 6' 1\" (1.854 m)   Wt 123.8 kg (273 lb)   BMI 36.02 kg/m²         Physical Exam  Constitutional:       Appearance: He is well-developed. HENT:      Head: Normocephalic and atraumatic. Eyes:      Conjunctiva/sclera: Conjunctivae normal.   Neck:      Thyroid: No thyromegaly. Vascular: No JVD. Trachea: No tracheal deviation. Cardiovascular:      Rate and Rhythm: Normal rate. Rhythm irregularly irregular. Heart sounds: Normal heart sounds. No murmur heard. No friction rub. No gallop. Pulmonary:      Effort: No respiratory distress. Breath sounds: Normal breath sounds. No wheezing or rales. Chest:      Chest wall: No tenderness. Abdominal:      Palpations: Abdomen is soft. Tenderness: There is no abdominal tenderness. Musculoskeletal:      Cervical back: Neck supple. Skin:     General: Skin is warm and dry. Neurological:      Mental Status: He is alert and oriented to person, place, and time.          Mr. Hank Stephens has a reminder for a \"due or due soon\" health maintenance. I have asked that he contact his primary care provider for follow-up on this health maintenance. I have personally reviewed patient's records available from hospital and other providers and incorporated findings in patient care. pcp-3/2016  SUMMARY:echo:2016  Left ventricle: Systolic function was normal. Ejection fraction was  estimated to be 60 %. No obvious wall motion abnormalities identified in  the views obtained. There was mild concentric hypertrophy. Doppler  parameters were consistent with abnormal left ventricular relaxation  (grade 1 diastolic dysfunction). Mitral valve: There was trivial regurgitation. NUCLEAR IMAGIN2016     Findings:   1. Stress images reveal normal Myoview distrubution in all the LV segments in short axis, vertical and horizontal long axis views. 2. Resting images have a normal uptake. 3. Gated images reveal normal wall motion and the ejection fraction is calculated to be 85%. Conclusion:   1. Normal perfusion scan. 2. Normal wall motion and ejection fraction. 3. Low risk scan.  2016-event monitor  Sr,st,no afib,? Svt  2018  1. Persistent atrial fibrillation (HCC) [I48.1]   Post-procedure Diagnoses:   1. Atrial fibrillation, currently in sinus rhythm [Z86.79]   Procedures:   1. CARDIOVERSION, ELECTIVE [[id number]]      []Hide copied text  []Hover for attribution information  Sedation provided by anesthesiology  200J x 1 DCCV provided. Patient converted to NSR  No complications         8079  Atrial fibrillation   -Incomplete right bundle branch block. ABNORMAL     PROCEDURE -2018  -Atrial fibrillation pulmonary vein isolation ablation using [employer] Arctic Front Cryoballoon.  -Cardiac electrophysiology study. 2018  PFT-normal diffusion capacity      Interpretation Summary 2019    · Left Ventricle: Normal cavity size and systolic function (ejection fraction normal). Mild concentric hypertrophy.  Estimated left ventricular ejection fraction is 66 - 70%. No regional wall motion abnormality noted. Moderate (grade 2) left ventricular diastolic dysfunction. · Right Ventricle: Mildly dilated right ventricle. · Right Atrium: Mildly dilated right atrium. · Mitral Valve: Mitral valve thickening. Mild mitral valve regurgitation is present. · Tricuspid Valve: Mild tricuspid valve regurgitation is present. · Pulmonary Artery: There is no evidence of pulmonary hypertension. Assessment         ICD-10-CM ICD-9-CM    1. PAF (paroxysmal atrial fibrillation) (HCC)  I48.0 427.31     Stable maintaining sinus rhythm on amiodarone monitor   2. High risk medication use  Z79.899 V58.69 PFT DLCO    Continue amiodarone for A. fib. Lab done with PCP. Will get repeat PFT   3. S/P ablation of atrial fibrillation  Z98.890 V45.89     Z86.79      Stable   4. Hyperlipidemia, unspecified hyperlipidemia type  E78.5 272.4     Continue treatment lab with PCP   5. Current use of long term anticoagulation  Z79.01 V58.61     Continue Eliquis for A. fib monitor       11/2017  Started eliquis for recurrent af-stopped asa  ? Antiarrhythmic in 4 weeks  I have discussed risk benefit and option of use of amiodarone for arrythmia. Risk of toxicity with medication are informed. Patient will require careful monitoring. 12/2017-persistant a fib  Started amiodarone-cardioversion in 4 weeks  1/2018  Cardioverted to sr  2/2018  Recurrent a fib  Awaiting sleep study result  Discussed ablation-  12/2018  Maintaining sinus rhythm after ablation in 4/2018. Recent PFT normal.  Follow-up labs  [facility]  Cardiac status stable. Blood pressure elevated start with salt restriction follow-up in 3 months  6/2020  Stable cardiac status. Blood pressure controlled continue current medical management. Follow-up PFT      There are no discontinued medications.     Orders Placed This Encounter    PFT DLCO     Standing Status:   Future     Standing Expiration Date:   12/12/2021 Follow-up and Dispositions    · Return in about 6 months (around 12/14/2021) for get labs from pcp.

## 2021-06-14 NOTE — PROGRESS NOTES
1. Have you been to the ER, urgent care clinic since your last visit? Hospitalized since your last visit?     no  2. Have you seen or consulted any other health care providers outside of the [address] [place] [address] since your last visit? Include any pap smears or colon screening.       Yes Where: pcp

## 2021-12-07 LAB — LDL-C, EXTERNAL: 96

## 2021-12-13 ENCOUNTER — OFFICE VISIT (OUTPATIENT)
Dept: CARDIOLOGY CLINIC | Age: 61
End: 2021-12-13
Payer: COMMERCIAL

## 2021-12-13 VITALS
DIASTOLIC BLOOD PRESSURE: 95 MMHG | SYSTOLIC BLOOD PRESSURE: 179 MMHG | HEIGHT: 73 IN | WEIGHT: 272 LBS | HEART RATE: 75 BPM | BODY MASS INDEX: 36.05 KG/M2

## 2021-12-13 DIAGNOSIS — I10 PRIMARY HYPERTENSION: ICD-10-CM

## 2021-12-13 DIAGNOSIS — E78.5 HYPERLIPIDEMIA, UNSPECIFIED HYPERLIPIDEMIA TYPE: ICD-10-CM

## 2021-12-13 DIAGNOSIS — Z98.890 S/P ABLATION OF ATRIAL FIBRILLATION: ICD-10-CM

## 2021-12-13 DIAGNOSIS — Z79.899 HIGH RISK MEDICATION USE: ICD-10-CM

## 2021-12-13 DIAGNOSIS — Z79.01 CURRENT USE OF LONG TERM ANTICOAGULATION: ICD-10-CM

## 2021-12-13 DIAGNOSIS — Z86.79 S/P ABLATION OF ATRIAL FIBRILLATION: ICD-10-CM

## 2021-12-13 DIAGNOSIS — I48.0 PAF (PAROXYSMAL ATRIAL FIBRILLATION) (HCC): Primary | ICD-10-CM

## 2021-12-13 PROCEDURE — 99214 OFFICE O/P EST MOD 30 MIN: CPT | Performed by: INTERNAL MEDICINE

## 2021-12-13 RX ORDER — AMLODIPINE BESYLATE 5 MG/1
5 TABLET ORAL DAILY
Qty: 90 TABLET | Refills: 3 | Status: SHIPPED
Start: 2021-12-13 | End: 2022-06-27 | Stop reason: ALTCHOICE

## 2021-12-13 NOTE — PROGRESS NOTES
HISTORY OF PRESENT ILLNESS  Fred Ruiz is a 64 y.o. male. Patient with paf,hyperlipidemia  On follow up patient denies any chest painspalpitation or other significant symptoms. One episode of extreme fatigue while in house-resolved with rest-no recurrence  6/2019 remains fatigue at times feels worse. Short of breath on exertion. No orthopnea PND or peripheral edema. No chest pain    Palpitations   The history is provided by the patient. This is a new problem. The problem has been resolved. The problem is associated with nothing. Pertinent negatives include no fever, no chest pain, no claudication, no orthopnea, no PND, no abdominal pain, no nausea, no vomiting, no headaches, no dizziness, no weakness, no cough, no hemoptysis, no shortness of breath and no sputum production. His past medical history is significant for hypertension. Hypertension  The history is provided by the patient. This is a chronic problem. The problem occurs constantly. The problem has not changed since onset. Pertinent negatives include no chest pain, no abdominal pain, no headaches and no shortness of breath. Cholesterol Problem  The history is provided by the patient. This is a chronic problem. The problem occurs constantly. The problem has not changed since onset. Pertinent negatives include no chest pain, no abdominal pain, no headaches and no shortness of breath. Follow-up  Pertinent negatives include no chest pain, no abdominal pain, no headaches and no shortness of breath. Review of Systems   Constitutional: Negative for chills and fever. HENT: Negative for nosebleeds. Eyes: Negative for blurred vision and double vision. Respiratory: Negative for cough, hemoptysis, sputum production, shortness of breath and wheezing. Cardiovascular: Positive for palpitations. Negative for chest pain, orthopnea, claudication, leg swelling and PND.    Gastrointestinal: Negative for abdominal pain, heartburn, nausea and vomiting. Musculoskeletal: Negative for myalgias. Skin: Negative for rash. Neurological: Negative for dizziness, weakness and headaches. Endo/Heme/Allergies: Does not bruise/bleed easily. Family History   Problem Relation Age of Onset    Stroke Father     Heart Disease Father     Heart Attack Mother     Colon Cancer Mother        Past Medical History:   Diagnosis Date    Acrochordon     [address] ([facility] [address]     Blood in urine     Elevated LFTs     Hematuria     Hyperlipidemia     Nevus, non-neoplastic     ISAÍAS on CPAP 2018    stable    Seborrheic keratosis     Sleep apnea        Past Surgical History:   Procedure Laterality Date    HX HERNIA REPAIR         Social History     Tobacco Use    Smoking status: Former Smoker     Types: Cigarettes     Quit date: 3/9/2013     Years since quittin.7    Smokeless tobacco: Never Used   Substance Use Topics    Alcohol use: Yes     Alcohol/week: 0.0 standard drinks     Comment: OCC        No Known Allergies        Visit Vitals  BP (!) 179/95   Pulse 75   Ht 6' 1\" (1.854 m)   Wt 123.4 kg (272 lb)   BMI 35.89 kg/m²         Physical Exam  Constitutional:       Appearance: He is well-developed. HENT:      Head: Normocephalic and atraumatic. Eyes:      Conjunctiva/sclera: Conjunctivae normal.   Neck:      Thyroid: No thyromegaly. Vascular: No JVD. Trachea: No tracheal deviation. Cardiovascular:      Rate and Rhythm: Normal rate. Rhythm irregularly irregular. Heart sounds: Normal heart sounds. No murmur heard. No friction rub. No gallop. Pulmonary:      Effort: No respiratory distress. Breath sounds: Normal breath sounds. No wheezing or rales. Chest:      Chest wall: No tenderness. Abdominal:      Palpations: Abdomen is soft. Tenderness: There is no abdominal tenderness. Musculoskeletal:      Cervical back: Neck supple. Skin:     General: Skin is warm and dry.    Neurological:      Mental Status: He is alert and oriented to person, place, and time. Mr. Gemma Lewis has a reminder for a \"due or due soon\" health maintenance. I have asked that he contact his primary care provider for follow-up on this health maintenance. I have personally reviewed patient's records available from hospital and other providers and incorporated findings in patient care. pcp-3/2016  SUMMARY:echo:2016  Left ventricle: Systolic function was normal. Ejection fraction was  estimated to be 60 %. No obvious wall motion abnormalities identified in  the views obtained. There was mild concentric hypertrophy. Doppler  parameters were consistent with abnormal left ventricular relaxation  (grade 1 diastolic dysfunction). Mitral valve: There was trivial regurgitation. NUCLEAR IMAGIN2016     Findings:   1. Stress images reveal normal Myoview distrubution in all the LV segments in short axis, vertical and horizontal long axis views. 2. Resting images have a normal uptake. 3. Gated images reveal normal wall motion and the ejection fraction is calculated to be 85%. Conclusion:   1. Normal perfusion scan. 2. Normal wall motion and ejection fraction. 3. Low risk scan.  2016-event monitor  Sr,st,no afib,? Svt  2018  1. Persistent atrial fibrillation (HCC) [I48.1]   Post-procedure Diagnoses:   1. Atrial fibrillation, currently in sinus rhythm [Z86.79]   Procedures:   1. CARDIOVERSION, ELECTIVE [[id number]]      []Hide copied text  []Sivakumarver for attribution information  Sedation provided by anesthesiology  200J x 1 DCCV provided. Patient converted to NSR  No complications         6705  Atrial fibrillation   -Incomplete right bundle branch block. ABNORMAL     PROCEDURE -2018  -Atrial fibrillation pulmonary vein isolation ablation using [employer] Arctic Front Cryoball[employer].  -Cardiac electrophysiology study.      2018  PFT-normal diffusion capacity      Interpretation Summary 2019    · Left Ventricle: Normal cavity size and systolic function (ejection fraction normal). Mild concentric hypertrophy. Estimated left ventricular ejection fraction is 66 - 70%. No regional wall motion abnormality noted. Moderate (grade 2) left ventricular diastolic dysfunction. · Right Ventricle: Mildly dilated right ventricle. · Right Atrium: Mildly dilated right atrium. · Mitral Valve: Mitral valve thickening. Mild mitral valve regurgitation is present. · Tricuspid Valve: Mild tricuspid valve regurgitation is present. · Pulmonary Artery: There is no evidence of pulmonary hypertension. PFT 12/2019  Normal diffusion capacity  Assessment         ICD-10-CM ICD-9-CM    1. PAF (paroxysmal atrial fibrillation) (HCC)  I48.0 427.31     Stable symptom continue treatment   2. High risk medication use  Z79.899 V58.69 TSH 3RD GENERATION      T4, FREE      HEPATIC FUNCTION PANEL      LIPID PANEL    Amiodarone for A. fib check labs   3. S/P ablation of atrial fibrillation  Z98.890 V45.89     Z86.79      Stable   4. Hyperlipidemia, unspecified hyperlipidemia type  E78.5 272.4     Continue treatment lab with PCP   5. Current use of long term anticoagulation  Z79.01 V58.61     For A. fib monitor   6. Primary hypertension  I10 401.9     New elevation of blood pressure amlodipine 5 mg a day. 11/2017  Started eliquis for recurrent af-stopped asa  ? Antiarrhythmic in 4 weeks  I have discussed risk benefit and option of use of amiodarone for arrythmia. Risk of toxicity with medication are informed. Patient will require careful monitoring. 12/2017-persistant a fib  Started amiodarone-cardioversion in 4 weeks  1/2018  Cardioverted to sr  2/2018  Recurrent a fib  Awaiting sleep study result  Discussed ablation-  12/2018  Maintaining sinus rhythm after ablation in 4/2018. Recent PFT normal.  Follow-up labs  [facility]  Cardiac status stable. Blood pressure elevated start with salt restriction follow-up in 3 months  6/2021  Stable cardiac status.   Blood pressure controlled continue current medical management. Follow-up PFT  12/2021  Blood pressure elevated I have added amlodipine. Check labs    There are no discontinued medications. Orders Placed This Encounter    TSH 3RD GENERATION     Standing Status:   Future     Standing Expiration Date:   1/12/2022    T4, FREE     Standing Status:   Future     Standing Expiration Date:   12/14/2022    HEPATIC FUNCTION PANEL     Standing Status:   Future     Standing Expiration Date:   6/13/2022    LIPID PANEL     Standing Status:   Future     Standing Expiration Date:   6/13/2022    amLODIPine (NORVASC) 5 mg tablet     Sig: Take 1 Tablet by mouth daily. Dispense:  90 Tablet     Refill:  3       Follow-up and Dispositions    · Return in about 6 months (around 6/13/2022).

## 2021-12-13 NOTE — PROGRESS NOTES
1. Have you been to the ER, urgent care clinic since your last visit? Hospitalized since your last visit? No    2. Have you seen or consulted any other health care providers outside of the [address] [place] [address] since your last visit? Include any pap smears or colon screening.       No

## 2022-01-26 ENCOUNTER — CLINICAL SUPPORT (OUTPATIENT)
Dept: CARDIOLOGY CLINIC | Age: 62
End: 2022-01-26

## 2022-01-26 DIAGNOSIS — I10 PRIMARY HYPERTENSION: Primary | ICD-10-CM

## 2022-01-26 NOTE — PROGRESS NOTES
Patient was in office today to have BP check per your request. BP was 144/84.  Please advise any new orders

## 2022-01-28 ENCOUNTER — TELEPHONE (OUTPATIENT)
Dept: CARDIOLOGY CLINIC | Age: 62
End: 2022-01-28

## 2022-01-28 NOTE — TELEPHONE ENCOUNTER
Patient was in office to have BP check per your request after the once of the year. BP was 144/84. Please advise if any changes.

## 2022-01-31 NOTE — TELEPHONE ENCOUNTER
Called and left message per  to continue with current dose of medication no changes at present. If any questions to call office.

## 2022-03-18 PROBLEM — Z86.79 S/P ABLATION OF ATRIAL FIBRILLATION: Status: ACTIVE | Noted: 2018-04-03

## 2022-03-18 PROBLEM — Z98.890 S/P ABLATION OF ATRIAL FIBRILLATION: Status: ACTIVE | Noted: 2018-04-03

## 2022-03-19 PROBLEM — Z79.899 HIGH RISK MEDICATION USE: Status: ACTIVE | Noted: 2018-02-01

## 2022-03-19 PROBLEM — Z79.01 CURRENT USE OF LONG TERM ANTICOAGULATION: Status: ACTIVE | Noted: 2021-06-14

## 2022-03-19 PROBLEM — Z99.89 OSA ON CPAP: Status: ACTIVE | Noted: 2018-06-05

## 2022-03-19 PROBLEM — G47.33 OSA ON CPAP: Status: ACTIVE | Noted: 2018-06-05

## 2022-03-19 PROBLEM — Z92.89 HISTORY OF CARDIOVERSION: Status: ACTIVE | Noted: 2018-02-01

## 2022-04-25 ENCOUNTER — TELEPHONE (OUTPATIENT)
Dept: CARDIOLOGY CLINIC | Age: 62
End: 2022-04-25

## 2022-04-25 NOTE — TELEPHONE ENCOUNTER
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 He mentioned that there was fire ants outside where he was working but states none was by him. He thinks amiodarone may have cause it or amlodipine and wanted to make you aware and what he needs to do. Patient has no other symptoms anywhere but hands.  Please advise

## 2022-04-26 NOTE — TELEPHONE ENCOUNTER
Spoke with patient per Dr. Aj Temple, patient is taking  Amlodipine and amiodarone for some time. Less likely that they are caused the sudden change. Continue monitoring if it does persist let us know. He can take Benadryl 25 mg to see if that helps. He voices understanding and acceptance of this advice and will call back if any further questions or concerns.

## 2022-04-26 NOTE — TELEPHONE ENCOUNTER
Patient is taking amlodipine and amiodarone for some time. Less likely that they are caused the sudden change. Continue monitoring if it does persist let us know.   He can take Benadryl 25 mg to see if that helps

## 2022-06-27 ENCOUNTER — OFFICE VISIT (OUTPATIENT)
Dept: CARDIOLOGY CLINIC | Age: 62
End: 2022-06-27
Payer: COMMERCIAL

## 2022-06-27 VITALS
HEIGHT: 73 IN | BODY MASS INDEX: 35.89 KG/M2 | DIASTOLIC BLOOD PRESSURE: 74 MMHG | OXYGEN SATURATION: 97 % | HEART RATE: 69 BPM | SYSTOLIC BLOOD PRESSURE: 140 MMHG

## 2022-06-27 DIAGNOSIS — Z79.01 CURRENT USE OF LONG TERM ANTICOAGULATION: ICD-10-CM

## 2022-06-27 DIAGNOSIS — Z86.79 S/P ABLATION OF ATRIAL FIBRILLATION: ICD-10-CM

## 2022-06-27 DIAGNOSIS — Z79.899 HIGH RISK MEDICATION USE: ICD-10-CM

## 2022-06-27 DIAGNOSIS — I48.0 PAF (PAROXYSMAL ATRIAL FIBRILLATION) (HCC): Primary | ICD-10-CM

## 2022-06-27 DIAGNOSIS — E78.5 HYPERLIPIDEMIA, UNSPECIFIED HYPERLIPIDEMIA TYPE: ICD-10-CM

## 2022-06-27 DIAGNOSIS — Z98.890 S/P ABLATION OF ATRIAL FIBRILLATION: ICD-10-CM

## 2022-06-27 DIAGNOSIS — I10 PRIMARY HYPERTENSION: ICD-10-CM

## 2022-06-27 PROCEDURE — 99215 OFFICE O/P EST HI 40 MIN: CPT | Performed by: INTERNAL MEDICINE

## 2022-06-27 RX ORDER — OLMESARTAN MEDOXOMIL 20 MG/1
20 TABLET ORAL DAILY
Qty: 90 TABLET | Refills: 3 | Status: SHIPPED | OUTPATIENT
Start: 2022-06-27

## 2022-06-27 NOTE — PROGRESS NOTES
HISTORY OF PRESENT ILLNESS  Thad Klein is a 58 y.o. male. Patient with paf,hyperlipidemia  On follow up patient denies any chest painspalpitation or other significant symptoms. One episode of extreme fatigue while in house-resolved with rest-no recurrence  6/2019 remains fatigue at times feels worse. Short of breath on exertion. No orthopnea PND or peripheral edema. No chest pain    Follow-up  Pertinent negatives include no chest pain, no abdominal pain, no headaches and no shortness of breath. Palpitations   The history is provided by the patient. This is a new problem. The problem has been resolved. The problem is associated with nothing. Pertinent negatives include no fever, no chest pain, no claudication, no orthopnea, no PND, no abdominal pain, no nausea, no vomiting, no headaches, no dizziness, no weakness, no cough, no hemoptysis, no shortness of breath and no sputum production. His past medical history is significant for hypertension. Hypertension  The history is provided by the patient. This is a chronic problem. The problem occurs constantly. The problem has not changed since onset. Pertinent negatives include no chest pain, no abdominal pain, no headaches and no shortness of breath. Cholesterol Problem  The history is provided by the patient. This is a chronic problem. The problem occurs constantly. The problem has not changed since onset. Pertinent negatives include no chest pain, no abdominal pain, no headaches and no shortness of breath. Review of Systems   Constitutional: Negative for chills and fever. HENT: Negative for nosebleeds. Eyes: Negative for blurred vision and double vision. Respiratory: Negative for cough, hemoptysis, sputum production, shortness of breath and wheezing. Cardiovascular: Positive for palpitations. Negative for chest pain, orthopnea, claudication, leg swelling and PND.    Gastrointestinal: Negative for abdominal pain, heartburn, nausea and vomiting. Musculoskeletal: Negative for myalgias. Skin: Negative for rash. Neurological: Negative for dizziness, weakness and headaches. Endo/Heme/Allergies: Does not bruise/bleed easily. Family History   Problem Relation Age of Onset    Stroke Father     Heart Disease Father     Heart Attack Mother     Colon Cancer Mother        Past Medical History:   Diagnosis Date    Acrochordon     Atrial fibrillation ([facility] [address]     Blood in urine     Elevated LFTs     Hematuria     Hyperlipidemia     Nevus, non-neoplastic     ISAÍAS on CPAP 2018    stable    Seborrheic keratosis     Sleep apnea        Past Surgical History:   Procedure Laterality Date    HX HERNIA REPAIR         Social History     Tobacco Use    Smoking status: Former Smoker     Types: Cigarettes     Quit date: 3/9/2013     Years since quittin.3    Smokeless tobacco: Never Used   Substance Use Topics    Alcohol use: Yes     Alcohol/week: 0.0 standard drinks     Comment: OCC        No Known Allergies        Visit Vitals  BP (!) 140/74 (BP 1 Location: Left upper arm, BP Patient Position: Sitting, BP Cuff Size: Adult)   Pulse 69   Ht 6' 1\" (1.854 m)   SpO2 97%   BMI 35.89 kg/m²         Physical Exam  Constitutional:       Appearance: He is well-developed. HENT:      Head: Normocephalic and atraumatic. Eyes:      Conjunctiva/sclera: Conjunctivae normal.   Neck:      Thyroid: No thyromegaly. Vascular: No JVD. Trachea: No tracheal deviation. Cardiovascular:      Rate and Rhythm: Normal rate. Rhythm irregularly irregular. Heart sounds: Normal heart sounds. No murmur heard. No friction rub. No gallop. Pulmonary:      Effort: No respiratory distress. Breath sounds: Normal breath sounds. No wheezing or rales. Chest:      Chest wall: No tenderness. Abdominal:      Palpations: Abdomen is soft. Tenderness: There is no abdominal tenderness. Musculoskeletal:      Cervical back: Neck supple.    Skin: General: Skin is warm and dry. Neurological:      Mental Status: He is alert and oriented to person, place, and time. Mr. Ashely Vargas has a reminder for a \"due or due soon\" health maintenance. I have asked that he contact his primary care provider for follow-up on this health maintenance. I have personally reviewed patient's records available from hospital and other providers and incorporated findings in patient care. pcp-3/2016  SUMMARY:echo:2016  Left ventricle: Systolic function was normal. Ejection fraction was  estimated to be 60 %. No obvious wall motion abnormalities identified in  the views obtained. There was mild concentric hypertrophy. Doppler  parameters were consistent with abnormal left ventricular relaxation  (grade 1 diastolic dysfunction). Mitral valve: There was trivial regurgitation. NUCLEAR IMAGIN2016     Findings:   1. Stress images reveal normal Myoview distrubution in all the LV segments in short axis, vertical and horizontal long axis views. 2. Resting images have a normal uptake. 3. Gated images reveal normal wall motion and the ejection fraction is calculated to be 85%. Conclusion:   1. Normal perfusion scan. 2. Normal wall motion and ejection fraction. 3. Low risk scan.  2016-event monitor  Sr,st,no afib,? Svt  2018  1. Persistent atrial fibrillation (HCC) [I48.1]   Post-procedure Diagnoses:   1. Atrial fibrillation, currently in sinus rhythm [Z86.79]   Procedures:   1. CARDIOVERSION, ELECTIVE [[id number]]      []Hide copied text  []Hover for attribution information  Sedation provided by anesthesiology  200J x 1 DCCV provided. Patient converted to NSR  No complications         4172  Atrial fibrillation   -Incomplete right bundle branch block. ABNORMAL     PROCEDURE -2018  -Atrial fibrillation pulmonary vein isolation ablation using Med[employer] Arctic Front Cryoballoon.  -Cardiac electrophysiology study.      2018  PFT-normal diffusion capacity      Interpretation Summary 12/2019    · Left Ventricle: Normal cavity size and systolic function (ejection fraction normal). Mild concentric hypertrophy. Estimated left ventricular ejection fraction is 66 - 70%. No regional wall motion abnormality noted. Moderate (grade 2) left ventricular diastolic dysfunction. · Right Ventricle: Mildly dilated right ventricle. · Right Atrium: Mildly dilated right atrium. · Mitral Valve: Mitral valve thickening. Mild mitral valve regurgitation is present. · Tricuspid Valve: Mild tricuspid valve regurgitation is present. · Pulmonary Artery: There is no evidence of pulmonary hypertension. PFT 12/2019  Normal diffusion capacity  Assessment         ICD-10-CM ICD-9-CM    1. PAF (paroxysmal atrial fibrillation) ([facility])  I48.0 427.31     Stable symptom continue treatment monitor   2. S/P ablation of atrial fibrillation  Z98.890 V45.89     Z86.79      Stable   3. High risk medication use  V26.339 F52.69 METABOLIC PANEL, BASIC      PFT DLCO      TSH 3RD GENERATION      T4, FREE    Patient on amiodarone for atrial arrhythmia. Monitor labs and PFT   4. Primary hypertension  I10 401.9       Blood pressure improving but had side effect of headache and edema with amlodipine will change to olmesartan   5. Current use of long term anticoagulation  Z79.01 V58.61     Continue treatment monitor   6. Hyperlipidemia, unspecified hyperlipidemia type  E78.5 272.4     Continue current therapy lab with PCP       11/2017  Started eliquis for recurrent af-stopped asa  ? Antiarrhythmic in 4 weeks  I have discussed risk benefit and option of use of amiodarone for arrythmia. Risk of toxicity with medication are informed. Patient will require careful monitoring. 12/2017-persistant a fib  Started amiodarone-cardioversion in 4 weeks  1/2018  Cardioverted to sr  2/2018  Recurrent a fib  Awaiting sleep study result  Discussed ablation-  12/2018  Maintaining sinus rhythm after ablation in 4/2018. Recent PFT normal.  Follow-up labs  [facility] [place], operated by [facility]  Cardiac status stable. Blood pressure elevated start with salt restriction follow-up in 3 months  6/2021  Stable cardiac status. Blood pressure controlled continue current medical management. Follow-up PFT  12/2021  Blood pressure elevated I have added amlodipine. Check labs  6/2022  I have discussed risk benefit and option of use of amiodarone for arrythmia. Risk of toxicity with medication are informed. Patient will require careful monitoring. Follow-up PFT and labs. Overall his cardiac status remains stable. Blood pressure is improving with side effect of hand and leg edema with headache with amlodipine will change to olmesartan. LFT reviewed. TSH done about 6-month ago. We will repeat TSH. Check PFT  Medications Discontinued During This Encounter   Medication Reason    amLODIPine (NORVASC) 5 mg tablet Alternate Therapy       Orders Placed This Encounter    METABOLIC PANEL, BASIC     Standing Status:   Future     Standing Expiration Date:   7/27/2022    TSH 3RD GENERATION     Standing Status:   Future     Standing Expiration Date:   7/27/2022    T4, FREE     Standing Status:   Future     Standing Expiration Date:   6/28/2023    PFT DLCO     Standing Status:   Future     Standing Expiration Date:   12/25/2022    olmesartan (BENICAR) 20 mg tablet     Sig: Take 1 Tablet by mouth daily. Dispense:  90 Tablet     Refill:  3       Follow-up and Dispositions    · Return in about 6 months (around 12/27/2022).

## 2022-07-11 DIAGNOSIS — Z79.899 HIGH RISK MEDICATION USE: ICD-10-CM

## 2022-07-11 DIAGNOSIS — I48.0 PAF (PAROXYSMAL ATRIAL FIBRILLATION) (HCC): ICD-10-CM

## 2022-07-11 DIAGNOSIS — E78.5 HYPERLIPIDEMIA, UNSPECIFIED HYPERLIPIDEMIA TYPE: Primary | ICD-10-CM

## 2022-07-11 RX ORDER — ATORVASTATIN CALCIUM 20 MG/1
20 TABLET, FILM COATED ORAL DAILY
Qty: 90 TABLET | Refills: 3 | Status: SHIPPED | OUTPATIENT
Start: 2022-07-11

## 2022-07-11 RX ORDER — AMIODARONE HYDROCHLORIDE 200 MG/1
200 TABLET ORAL DAILY
Qty: 90 TABLET | Refills: 3 | Status: SHIPPED | OUTPATIENT
Start: 2022-07-11

## 2022-07-26 ENCOUNTER — HOSPITAL ENCOUNTER (OUTPATIENT)
Dept: PULMONOLOGY | Age: 62
Discharge: HOME | End: 2022-07-26
Payer: COMMERCIAL

## 2022-07-26 VITALS — OXYGEN SATURATION: 98 %

## 2022-07-26 DIAGNOSIS — Z79.899 HIGH RISK MEDICATION USE: ICD-10-CM

## 2022-07-26 PROCEDURE — 94726 PLETHYSMOGRAPHY LUNG VOLUMES: CPT

## 2022-07-26 PROCEDURE — 94060 EVALUATION OF WHEEZING: CPT

## 2022-07-26 PROCEDURE — 74011000250 HC RX REV CODE- 250: Performed by: PHYSICIAN ASSISTANT

## 2022-07-26 PROCEDURE — 94729 DIFFUSING CAPACITY: CPT

## 2022-07-26 RX ORDER — ALBUTEROL SULFATE 0.83 MG/ML
2.5 SOLUTION RESPIRATORY (INHALATION)
Status: COMPLETED | OUTPATIENT
Start: 2022-07-26 | End: 2022-07-26

## 2022-07-26 RX ADMIN — ALBUTEROL SULFATE 2.5 MG: 2.5 SOLUTION RESPIRATORY (INHALATION) at 11:49

## 2022-07-26 NOTE — PROGRESS NOTES
Patient tolerated neb and procedure well. He gave his best effort. He was given a unit dose albuterol with no adverse reactions noted.   Breathsounds clear pre and post Spo2 98%

## 2022-12-12 ENCOUNTER — OFFICE VISIT (OUTPATIENT)
Dept: CARDIOLOGY CLINIC | Age: 62
End: 2022-12-12
Payer: COMMERCIAL

## 2022-12-12 VITALS
HEART RATE: 83 BPM | SYSTOLIC BLOOD PRESSURE: 142 MMHG | BODY MASS INDEX: 36.45 KG/M2 | WEIGHT: 275 LBS | DIASTOLIC BLOOD PRESSURE: 79 MMHG | HEIGHT: 73 IN | OXYGEN SATURATION: 98 %

## 2022-12-12 DIAGNOSIS — I48.0 PAF (PAROXYSMAL ATRIAL FIBRILLATION) (HCC): Primary | ICD-10-CM

## 2022-12-12 DIAGNOSIS — Z79.01 CURRENT USE OF LONG TERM ANTICOAGULATION: ICD-10-CM

## 2022-12-12 DIAGNOSIS — Z86.79 S/P ABLATION OF ATRIAL FIBRILLATION: ICD-10-CM

## 2022-12-12 DIAGNOSIS — Z79.899 HIGH RISK MEDICATION USE: ICD-10-CM

## 2022-12-12 DIAGNOSIS — I10 PRIMARY HYPERTENSION: ICD-10-CM

## 2022-12-12 DIAGNOSIS — Z98.890 S/P ABLATION OF ATRIAL FIBRILLATION: ICD-10-CM

## 2022-12-12 PROCEDURE — 3078F DIAST BP <80 MM HG: CPT | Performed by: INTERNAL MEDICINE

## 2022-12-12 PROCEDURE — 99214 OFFICE O/P EST MOD 30 MIN: CPT | Performed by: INTERNAL MEDICINE

## 2022-12-12 PROCEDURE — 3074F SYST BP LT 130 MM HG: CPT | Performed by: INTERNAL MEDICINE

## 2022-12-12 NOTE — PROGRESS NOTES
HISTORY OF PRESENT ILLNESS  Osiel Garza is a 58 y.o. male. Patient with paf,hyperlipidemia  On follow up patient denies any chest painspalpitation or other significant symptoms. One episode of extreme fatigue while in house-resolved with rest-no recurrence  6/2019 remains fatigue at times feels worse. Short of breath on exertion. No orthopnea PND or peripheral edema. No chest pain    Follow-up  Pertinent negatives include no chest pain, no abdominal pain, no headaches and no shortness of breath. Palpitations   The history is provided by the Patient. This is a new problem. The problem has been resolved. The problem is associated with nothing. Pertinent negatives include no fever, no chest pain, no claudication, no orthopnea, no PND, no abdominal pain, no nausea, no vomiting, no headaches, no dizziness, no weakness, no cough, no hemoptysis, no shortness of breath and no sputum production. His past medical history is significant for hypertension. Hypertension  The history is provided by the Patient. This is a chronic problem. The problem occurs constantly. The problem has not changed since onset. Pertinent negatives include no chest pain, no abdominal pain, no headaches and no shortness of breath. Cholesterol Problem  The history is provided by the Patient. This is a chronic problem. The problem occurs constantly. The problem has not changed since onset. Pertinent negatives include no chest pain, no abdominal pain, no headaches and no shortness of breath. Review of Systems   Constitutional:  Negative for chills and fever. HENT:  Negative for nosebleeds. Eyes:  Negative for blurred vision and double vision. Respiratory:  Negative for cough, hemoptysis, sputum production, shortness of breath and wheezing. Cardiovascular:  Positive for palpitations. Negative for chest pain, orthopnea, claudication, leg swelling and PND.    Gastrointestinal:  Negative for abdominal pain, heartburn, nausea and vomiting. Musculoskeletal:  Negative for myalgias. Skin:  Negative for rash. Neurological:  Negative for dizziness, weakness and headaches. Endo/Heme/Allergies:  Does not bruise/bleed easily. Family History   Problem Relation Age of Onset    Stroke Father     Heart Disease Father     Heart Attack Mother     Colon Cancer Mother        Past Medical History:   Diagnosis Date    Acrochordon     Atrial fibrillation ([facility] [address]     Blood in urine     Elevated LFTs     Hematuria     Hyperlipidemia     Nevus, non-neoplastic     ISAÍAS on CPAP 2018    stable    Seborrheic keratosis     Sleep apnea        Past Surgical History:   Procedure Laterality Date    HX HERNIA REPAIR         Social History     Tobacco Use    Smoking status: Former     Types: Cigarettes     Quit date: 3/9/2013     Years since quittin.7    Smokeless tobacco: Never   Substance Use Topics    Alcohol use: Yes     Alcohol/week: 0.0 standard drinks     Comment: OCC        No Known Allergies        Visit Vitals  BP (!) 142/79 (BP 1 Location: Left upper arm, BP Patient Position: Sitting, BP Cuff Size: Adult)   Pulse 83   Ht 6' 1\" (1.854 m)   Wt 124.7 kg (275 lb)   SpO2 98%   BMI 36.28 kg/m²         Physical Exam  Constitutional:       Appearance: He is well-developed. HENT:      Head: Normocephalic and atraumatic. Eyes:      Conjunctiva/sclera: Conjunctivae normal.   Neck:      Thyroid: No thyromegaly. Vascular: No JVD. Trachea: No tracheal deviation. Cardiovascular:      Rate and Rhythm: Normal rate. Rhythm irregularly irregular. Heart sounds: Normal heart sounds. No murmur heard. No friction rub. No gallop. Pulmonary:      Effort: No respiratory distress. Breath sounds: Normal breath sounds. No wheezing or rales. Chest:      Chest wall: No tenderness. Abdominal:      Palpations: Abdomen is soft. Tenderness: There is no abdominal tenderness. Musculoskeletal:      Cervical back: Neck supple.    Skin: General: Skin is warm and dry. Neurological:      Mental Status: He is alert and oriented to person, place, and time. Mr. Navdeep Rodriguez has a reminder for a \"due or due soon\" health maintenance. I have asked that he contact his primary care provider for follow-up on this health maintenance. I have personally reviewed patient's records available from hospital and other providers and incorporated findings in patient care. pcp-3/2016  SUMMARY:echo:2016  Left ventricle: Systolic function was normal. Ejection fraction was  estimated to be 60 %. No obvious wall motion abnormalities identified in  the views obtained. There was mild concentric hypertrophy. Doppler  parameters were consistent with abnormal left ventricular relaxation  (grade 1 diastolic dysfunction). Mitral valve: There was trivial regurgitation. NUCLEAR IMAGIN2016     Findings:   1. Stress images reveal normal Myoview distrubution in all the LV segments in short axis, vertical and horizontal long axis views. 2. Resting images have a normal uptake. 3. Gated images reveal normal wall motion and the ejection fraction is calculated to be 85%. Conclusion:   1. Normal perfusion scan. 2. Normal wall motion and ejection fraction. 3. Low risk scan.  2016-event monitor  Sr,st,no afib,? Svt  2018  1. Persistent atrial fibrillation (HCC) [I48.1]   Post-procedure Diagnoses:   1. Atrial fibrillation, currently in sinus rhythm [Z86.79]   Procedures:   1. CARDIOVERSION, ELECTIVE [[id number]]      []Hide copied text  []Hover for attribution information  Sedation provided by anesthesiology  200J x 1 DCCV provided. Patient converted to NSR  No complications         6346  Atrial fibrillation   -Incomplete right bundle branch block. ABNORMAL     PROCEDURE -2018  -Atrial fibrillation pulmonary vein isolation ablation using [employer] Arctic Front Cryoballoon.  -Cardiac electrophysiology study.      2018  PFT-normal diffusion capacity      Interpretation Summary 12/2019    Left Ventricle: Normal cavity size and systolic function (ejection fraction normal). Mild concentric hypertrophy. Estimated left ventricular ejection fraction is 66 - 70%. No regional wall motion abnormality noted. Moderate (grade 2) left ventricular diastolic dysfunction. Right Ventricle: Mildly dilated right ventricle. Right Atrium: Mildly dilated right atrium. Mitral Valve: Mitral valve thickening. Mild mitral valve regurgitation is present. Tricuspid Valve: Mild tricuspid valve regurgitation is present. Pulmonary Artery: There is no evidence of pulmonary hypertension. PFT 12/2019  Normal diffusion capacity      I Have personally reviewed recent relevant labs available and discussed with patient  7/2022-TSH, free T4 normal, BMP  6/2022-CMP  Assessment         ICD-10-CM ICD-9-CM    1. PAF (paroxysmal atrial fibrillation) ([facility])  I48.0 427.31     Stable continue treatment monitor      2. S/P ablation of atrial fibrillation  Z98.890 V45.89     Z86.79      Stable      3. Primary hypertension  I10 401.9     Stable continue current treatment      4. High risk medication use  Z79.899 V58.69     Stable monitor      5. Current use of long term anticoagulation  Z79.01 V58.61     Continue for A. fib          11/2017  Started eliquis for recurrent af-stopped asa  ? Antiarrhythmic in 4 weeks  I have discussed risk benefit and option of use of amiodarone for arrythmia. Risk of toxicity with medication are informed. Patient will require careful monitoring. 12/2017-persistant a fib  Started amiodarone-cardioversion in 4 weeks  1/2018  Cardioverted to sr  2/2018  Recurrent a fib  Awaiting sleep study result  Discussed ablation-  12/2018  Maintaining sinus rhythm after ablation in 4/2018. Recent PFT normal.  Follow-up labs  [facility]  Cardiac status stable. Blood pressure elevated start with salt restriction follow-up in 3 months  6/2021  Stable cardiac status.   Blood pressure controlled continue current medical management. Follow-up PFT  12/2021  Blood pressure elevated I have added amlodipine. Check labs  6/2022  I have discussed risk benefit and option of use of amiodarone for arrythmia. Risk of toxicity with medication are informed. Patient will require careful monitoring. Follow-up PFT and labs. Overall his cardiac status remains stable. Blood pressure is improving with side effect of hand and leg edema with headache with amlodipine will change to olmesartan. LFT reviewed. TSH done about 6-month ago. We will repeat TSH. Check PFT    12/2022  Stable cardiac status continue current medical management monitor. PFT was done and we will try and get the result. Lab with PCP  There are no discontinued medications. No orders of the defined types were placed in this encounter. Follow-up and Dispositions    Return in about 6 months (around 6/12/2023).

## 2022-12-12 NOTE — PROGRESS NOTES
1. Have you been to the ER, urgent care clinic since your last visit? Hospitalized since your last visit? No    2. Have you seen or consulted any other health care providers outside of the [address] [place] [address] since your last visit? Include any pap smears or colon screening.  Yes Where: PCP

## 2023-04-26 ENCOUNTER — OFFICE VISIT (OUTPATIENT)
Age: 63
End: 2023-04-26
Payer: COMMERCIAL

## 2023-04-26 VITALS
DIASTOLIC BLOOD PRESSURE: 80 MMHG | HEART RATE: 77 BPM | BODY MASS INDEX: 37.11 KG/M2 | HEIGHT: 73 IN | SYSTOLIC BLOOD PRESSURE: 155 MMHG | OXYGEN SATURATION: 96 % | WEIGHT: 280 LBS

## 2023-04-26 DIAGNOSIS — I48.0 PAROXYSMAL ATRIAL FIBRILLATION (HCC): Primary | ICD-10-CM

## 2023-04-26 DIAGNOSIS — E78.5 HYPERLIPIDEMIA, UNSPECIFIED HYPERLIPIDEMIA TYPE: ICD-10-CM

## 2023-04-26 DIAGNOSIS — I10 ESSENTIAL (PRIMARY) HYPERTENSION: ICD-10-CM

## 2023-04-26 PROCEDURE — 99214 OFFICE O/P EST MOD 30 MIN: CPT | Performed by: INTERNAL MEDICINE

## 2023-04-26 PROCEDURE — 3077F SYST BP >= 140 MM HG: CPT | Performed by: INTERNAL MEDICINE

## 2023-04-26 PROCEDURE — 93000 ELECTROCARDIOGRAM COMPLETE: CPT | Performed by: INTERNAL MEDICINE

## 2023-04-26 PROCEDURE — 3079F DIAST BP 80-89 MM HG: CPT | Performed by: INTERNAL MEDICINE

## 2023-04-26 ASSESSMENT — PATIENT HEALTH QUESTIONNAIRE - PHQ9
SUM OF ALL RESPONSES TO PHQ QUESTIONS 1-9: 0
SUM OF ALL RESPONSES TO PHQ9 QUESTIONS 1 & 2: 0
2. FEELING DOWN, DEPRESSED OR HOPELESS: 0
SUM OF ALL RESPONSES TO PHQ QUESTIONS 1-9: 0
1. LITTLE INTEREST OR PLEASURE IN DOING THINGS: 0

## 2023-04-26 NOTE — PROGRESS NOTES
Black Ayoub is a 58y.o. year old male. Patient with paf,hyperlipidemia   On follow up patient denies any chest painspalpitation or other significant symptoms. One episode of extreme fatigue while in house-resolved with rest-no recurrence   2019 remains fatigue at times feels worse. Short of breath on exertion. No orthopnea PND or peripheral edema. No chest pain     was in sun 3 weeks ago and developed sun tan and some blisters on both arms. These healed but still itchy and has paresthesias. Has been taking amiodarone for last 2 to 3 years and wondering if we should change it. Review of Systems      Physical Exam      I have personally reviewed patient's records available from hospital and other providers and incorporated findings in patient care. pcp-3/2016   SUMMARY:echo:2016  Left ventricle: Systolic function was normal. Ejection fraction was  estimated to be 60 %. No obvious wall motion  abnormalities identified in  the views obtained. There was mild concentric hypertrophy. Doppler  parameters were consistent with abnormal left ventricular relaxation  (grade 1 diastolic dysfunction). Mitral valve: There was trivial  regurgitation. NUCLEAR IMAGIN2016       Findings:    1. Stress images reveal normal Myoview distrubution in all the LV segments in short axis, vertical and horizontal long axis views. 2. Resting images have a normal uptake. 3. Gated images reveal normal wall motion and the ejection fraction is calculated to be 85%. Conclusion:    1. Normal perfusion scan. 2. Normal wall motion and ejection fraction. 3. Low risk scan.   2016-event monitor   Sr,st,no afib,? Svt   2018       1. Persistent atrial fibrillation (HCC) [I48.1]     Post-procedure Diagnoses:     1. Atrial fibrillation, currently in sinus rhythm [Z86.79]     Procedures:     1.  Ronaldo Church [[id number]]           [] Hide copied text       []Hover for attribution

## 2023-04-26 NOTE — PROGRESS NOTES
1. Have you been to the ER, urgent care clinic since your last visit? Hospitalized since your last visit? no        2. Where do you normally have your labs drawn?   [facility]    3. Do you need any refills today?   no    4. Which local pharmacy do you use (enter pharmacy)? [facility]    5. Which mail order pharmacy do you use (enter pharmacy)?   no     6. Are you here for surgical clearance and if so who will be doing your     procedure/surgery (care team)?    yes

## 2023-05-30 DIAGNOSIS — I10 ESSENTIAL (PRIMARY) HYPERTENSION: Primary | ICD-10-CM

## 2023-05-30 RX ORDER — OLMESARTAN MEDOXOMIL 20 MG/1
20 TABLET ORAL DAILY
Qty: 90 TABLET | Refills: 3 | Status: SHIPPED | OUTPATIENT
Start: 2023-05-30

## 2023-05-30 NOTE — TELEPHONE ENCOUNTER
Requested Prescriptions     Pending Prescriptions Disp Refills    olmesartan (BENICAR) 20 MG tablet 90 tablet 3     Sig: Take 1 tablet by mouth daily

## 2023-06-19 LAB — LDL CHOLESTEROL, EXTERNAL: 88

## 2023-06-26 ENCOUNTER — OFFICE VISIT (OUTPATIENT)
Age: 63
End: 2023-06-26
Payer: COMMERCIAL

## 2023-06-26 VITALS
SYSTOLIC BLOOD PRESSURE: 134 MMHG | BODY MASS INDEX: 36.98 KG/M2 | HEART RATE: 72 BPM | HEIGHT: 73 IN | OXYGEN SATURATION: 95 % | WEIGHT: 279 LBS | DIASTOLIC BLOOD PRESSURE: 64 MMHG

## 2023-06-26 DIAGNOSIS — I10 ESSENTIAL (PRIMARY) HYPERTENSION: ICD-10-CM

## 2023-06-26 DIAGNOSIS — Z79.01 LONG TERM (CURRENT) USE OF ANTICOAGULANTS: ICD-10-CM

## 2023-06-26 DIAGNOSIS — I48.0 PAROXYSMAL ATRIAL FIBRILLATION (HCC): Primary | ICD-10-CM

## 2023-06-26 DIAGNOSIS — E78.5 HYPERLIPIDEMIA, UNSPECIFIED HYPERLIPIDEMIA TYPE: ICD-10-CM

## 2023-06-26 PROCEDURE — 3078F DIAST BP <80 MM HG: CPT | Performed by: INTERNAL MEDICINE

## 2023-06-26 PROCEDURE — 3075F SYST BP GE 130 - 139MM HG: CPT | Performed by: INTERNAL MEDICINE

## 2023-06-26 PROCEDURE — 99214 OFFICE O/P EST MOD 30 MIN: CPT | Performed by: INTERNAL MEDICINE

## 2023-06-26 RX ORDER — CHLORAL HYDRATE 500 MG
CAPSULE ORAL
COMMUNITY

## 2023-06-26 ASSESSMENT — PATIENT HEALTH QUESTIONNAIRE - PHQ9
2. FEELING DOWN, DEPRESSED OR HOPELESS: 0
SUM OF ALL RESPONSES TO PHQ QUESTIONS 1-9: 0
SUM OF ALL RESPONSES TO PHQ9 QUESTIONS 1 & 2: 0
SUM OF ALL RESPONSES TO PHQ QUESTIONS 1-9: 0
1. LITTLE INTEREST OR PLEASURE IN DOING THINGS: 0
SUM OF ALL RESPONSES TO PHQ QUESTIONS 1-9: 0
SUM OF ALL RESPONSES TO PHQ QUESTIONS 1-9: 0
DEPRESSION UNABLE TO ASSESS: FUNCTIONAL CAPACITY MOTIVATION LIMITS ACCURACY

## 2023-07-17 RX ORDER — APIXABAN 5 MG/1
TABLET, FILM COATED ORAL
Qty: 180 TABLET | Refills: 1 | Status: SHIPPED | OUTPATIENT
Start: 2023-07-17

## 2023-07-17 RX ORDER — ATORVASTATIN CALCIUM 20 MG/1
TABLET, FILM COATED ORAL
Qty: 90 TABLET | Refills: 1 | Status: SHIPPED | OUTPATIENT
Start: 2023-07-17

## 2024-01-08 RX ORDER — APIXABAN 5 MG/1
5 TABLET, FILM COATED ORAL 2 TIMES DAILY
Qty: 180 TABLET | Refills: 1 | Status: SHIPPED | OUTPATIENT
Start: 2024-01-08

## 2024-01-08 RX ORDER — ATORVASTATIN CALCIUM 20 MG/1
20 TABLET, FILM COATED ORAL DAILY
Qty: 90 TABLET | Refills: 1 | Status: SHIPPED | OUTPATIENT
Start: 2024-01-08
